# Patient Record
Sex: FEMALE | Race: WHITE | NOT HISPANIC OR LATINO | Employment: FULL TIME
[De-identification: names, ages, dates, MRNs, and addresses within clinical notes are randomized per-mention and may not be internally consistent; named-entity substitution may affect disease eponyms.]

---

## 2017-07-29 ENCOUNTER — HEALTH MAINTENANCE LETTER (OUTPATIENT)
Age: 47
End: 2017-07-29

## 2018-06-17 ENCOUNTER — HOSPITAL ENCOUNTER (EMERGENCY)
Facility: CLINIC | Age: 48
Discharge: HOME | End: 2018-06-18
Attending: EMERGENCY MEDICINE | Admitting: EMERGENCY MEDICINE
Payer: COMMERCIAL

## 2018-06-17 DIAGNOSIS — S32.512A CLOSED FRACTURE OF SUPERIOR RAMUS OF LEFT PUBIS, INITIAL ENCOUNTER (H): ICD-10-CM

## 2018-06-17 DIAGNOSIS — M25.572 ACUTE LEFT ANKLE PAIN: ICD-10-CM
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
## 2018-06-17 DIAGNOSIS — R07.9 ACUTE CHEST PAIN: ICD-10-CM

## 2018-06-17 PROCEDURE — 93010 ELECTROCARDIOGRAM REPORT: CPT | Mod: Z6 | Performed by: EMERGENCY MEDICINE

## 2018-06-17 PROCEDURE — 99284 EMERGENCY DEPT VISIT MOD MDM: CPT | Mod: 25 | Performed by: EMERGENCY MEDICINE

## 2018-06-17 PROCEDURE — 93005 ELECTROCARDIOGRAM TRACING: CPT

## 2018-06-17 PROCEDURE — 99285 EMERGENCY DEPT VISIT HI MDM: CPT | Mod: 25

## 2018-06-17 PROCEDURE — 36415 COLL VENOUS BLD VENIPUNCTURE: CPT

## 2018-06-17 NOTE — ED AVS SNAPSHOT
[facility] Emergency Department    [address] [address]    Phone:  [phone]    Fax:  [phone]                                       Aundrea Swenson   MRN: [id number]    Department:  [facility] Emergency Department   Date of Visit:  6/17/2018           Patient Information     Date Of Birth          1970        Your diagnoses for this visit were:     Acute chest pain     Fall down stairs, initial encounter     Closed fracture of superior ramus of left pubis, initial encounter (H)     Acute left ankle pain        You were seen by Felipe Gonzalez MD.        Discharge Instructions       Return to the emergency department if you have worsening symptoms, repeated vomiting, chest pain, or other concerns.  Otherwise follow-up in clinic for recheck in 1-2 weeks.  Use the walker for stability with ambulation and discontinue as you are feeling better.    Use ibuprofen and acetaminophen for your symptoms.  Use oxycodone as needed for pain that is not controlled by the prior two medications.    Oxycodone is an addictive opioid medication, please only take it when the pain is more than can be controlled by acetaminophen or ibuprofen alone. It will also make you lightheaded, nauseated, and constipated.  Do not drive, operate heavy machinery, or take care of young children while taking this medication.     Repeated studies have shown that the longer you use opioid pain medications, the longer it is until you return to normal function. It is our recommendation that you taper off opioids as quickly as possible with the goal of returning to normal function or near normal function. Long term use of opioids quickly results in growing tolerance to the medication (less of the benefits) and increased dependence (more of the bad side effects).     Pain is very difficult to treat and we can very rarely take away pain completely. If you are having difficulty with pain over several weeks after an  injury, you may need to start different medications and therapies (such as physical therapy, graded exercise, massage, and acupuncture). Please talk about this with your regular doctor.     If you are interested in seeking free, confidential treatment referral and information service for individuals and families facing mental health and/or substance use disorders please call [phone]    24 Hour Appointment Hotline       To make an appointment at any [facility] clinic, call [phone] ([phone]). If you don't have a family doctor or clinic, we will help you find one. [facility] clinics are conveniently located to serve the needs of you and your family.          ED Discharge Orders     ORTHO  REFERRAL       [facility] is referring you to the Orthopedic  Services at [facility].       The  Representative will assist you in the coordination of your Orthopedic and Musculoskeletal Care as prescribed by your physician.    The  Representative will call you within 1 business day to help schedule your appointment, or you may contact the  Representative at:    All areas ~ [phone]     Type of Referral : Non Surgical       Timeframe requested: 3 - 5 days    Coverage of these services is subject to the terms and limitations of your health insurance plan.  Please call member services at your health plan with any benefit or coverage questions.      If X-rays, CT or MRI's have been performed, please contact the facility where they were done to arrange for , prior to your scheduled appointment.  Please bring this referral request to your appointment and present it to your specialist.                     Review of your medicines      START taking        Dose / Directions Last dose taken    oxyCODONE IR 5 MG tablet   Commonly known as:  ROXICODONE   Dose:  5 mg   Quantity:  12 tablet        Take 1 tablet (5 mg) by mouth  every 6 hours as needed for pain   Refills:  0          Our records show that you are taking the medicines listed below. If these are incorrect, please call your family doctor or clinic.        Dose / Directions Last dose taken    aspirin 81 MG tablet   Dose:  1 tablet        Take 1 tablet by mouth daily.   Refills:  0        cyclobenzaprine 10 MG tablet   Commonly known as:  FLEXERIL   Dose:  10 mg   Quantity:  20 tablet        Take 1 tablet (10 mg) by mouth 3 times daily as needed   Refills:  1        diclofenac 50 MG EC tablet   Commonly known as:  VOLTAREN   Dose:  50 mg   Quantity:  90 tablet        Take 1 tablet (50 mg) by mouth 3 times daily   Refills:  1        EPINEPHrine 0.3 MG/0.3ML injection 2-pack   Commonly known as:  EPIPEN/ADRENACLICK/or ANY BX GENERIC EQUIV   Quantity:  2 each        Inject 0.3 mls into the muscle once as needed.   Refills:  1        lidocaine 5 % Patch   Commonly known as:  LIDODERM   Quantity:  30 patch        Apply 1 patches to effected area and leave in place for 12hours . Than remove and leave off for 12 hours.   Refills:  3        naproxen 500 MG tablet   Commonly known as:  NAPROSYN   Dose:  500 mg   Quantity:  100 tablet        Take 1 tablet (500 mg) by mouth every 12 hours   Refills:  0        nicotine 7 MG/24HR 24 hr patch   Commonly known as:  NICODERM CQ   Dose:  1 patch   Quantity:  30 patch        Place 1 patch onto the skin every 24 hours   Refills:  1        nitroGLYcerin 0.4 MG sublingual tablet   Commonly known as:  NITROSTAT   Dose:  0.4 mg   Quantity:  25 tablet        Place 1 tablet (0.4 mg) under the tongue every 5 minutes as needed Up to 3 dose, call 911 if pain persists   Refills:  0                Information about OPIOIDS     PRESCRIPTION OPIOIDS: WHAT YOU NEED TO KNOW   We gave you an opioid (narcotic) pain medicine. It is important to manage your pain, but opioids are not always the best choice. You should first try all the other options your care team  gave you. Take this medicine for as short a time (and as few doses) as possible.     These medicines have risks:    DO NOT drive when on new or higher doses of pain medicine. These medicines can affect your alertness and reaction times, and you could be arrested for driving under the influence (DUI). If you need to use opioids long-term, talk to your care team about driving.    DO NOT operate heave machinery    DO NOT do any other dangerous activities while taking these medicines.     DO NOT drink any alcohol while taking these medicines.      If the opioid prescribed includes acetaminophen, DO NOT take with any other medicines that contain acetaminophen. Read all labels carefully. Look for the word  acetaminophen  or  Tylenol.  Ask your pharmacist if you have questions or are unsure.    You can get addicted to pain medicines, especially if you have a history of addiction (chemical, alcohol or substance dependence). Talk to your care team about ways to reduce this risk.    Store your pills in a secure place, locked if possible. We will not replace any lost or stolen medicine. If you don t finish your medicine, please throw away (dispose) as directed by your pharmacist. The [place] [employer] has more information about safe disposal: https://www.pca.[facility].us/living-green/managing-unwanted-medications.     All opioids tend to cause constipation. Drink plenty of water and eat foods that have a lot of fiber, such as fruits, vegetables, prune juice, apple juice and high-fiber cereal. Take a laxative (Miralax, milk of magnesia, Colace, Senna) if you don t move your bowels at least every other day.         Prescriptions were sent or printed at these locations (1 Prescription)                   Other Prescriptions                Printed at Department/Unit printer (1 of 1)         oxyCODONE IR (ROXICODONE) 5 MG tablet                Procedures and tests performed during your visit     Procedure/Test Number  of Times Performed    Basic metabolic panel 1    CBC with platelets differential 1    EKG 12 lead 1    Pelvis XR w/ unilateral hip left 1    Troponin I 2    XR Ankle Left G/E 3 Views 1      Orders Needing Specimen Collection     None      Pending Results     Date and Time Order Name Status Description    6/18/2018 0002 Pelvis XR w/ unilateral hip left Preliminary     6/18/2018 0002 XR Ankle Left G/E 3 Views Preliminary             Pending Culture Results     No orders found for last 3 day(s).            Pending Results Instructions     If you had any lab results that were not finalized at the time of your Discharge, you can call the ED Lab Result RN at [phone]. You will be contacted by this team for any positive Lab results or changes in treatment. The nurses are available 7 days a week from 10A to 6:30P.  You can leave a message 24 hours per day and they will return your call.        Test Results From Your Hospital Stay        6/18/2018 12:40 AM      Component Results     Component Value Ref Range & Units Status    Sodium 144 133 - 144 mmol/L Final    Potassium 3.3 (L) 3.4 - 5.3 mmol/L Final    Chloride 111 (H) 94 - 109 mmol/L Final    Carbon Dioxide 22 20 - 32 mmol/L Final    Anion Gap 11 3 - 14 mmol/L Final    Glucose 79 70 - 99 mg/dL Final    Urea Nitrogen 15 7 - 30 mg/dL Final    Creatinine 0.73 0.52 - 1.04 mg/dL Final    GFR Estimate 86 >60 mL/min/1.7m2 Final    Non  GFR Calc    GFR Estimate If Black >90 >60 mL/min/1.7m2 Final    African American GFR Calc    Calcium 8.4 (L) 8.5 - 10.1 mg/dL Final         6/18/2018 12:22 AM      Component Results     Component Value Ref Range & Units Status    WBC 7.5 4.0 - 11.0 10e9/L Final    RBC Count 4.87 3.8 - 5.2 10e12/L Final    Hemoglobin 14.3 11.7 - 15.7 g/dL Final    Hematocrit 44.5 35.0 - 47.0 % Final    MCV 91 78 - 100 fl Final    MCH 29.4 26.5 - 33.0 pg Final    MCHC 32.1 31.5 - 36.5 g/dL Final    RDW 13.0 10.0 - 15.0 % Final    Platelet Count  199 150 - 450 10e9/L Final    Diff Method Automated Method  Final    % Neutrophils 50.8 % Final    % Lymphocytes 40.5 % Final    % Monocytes 5.7 % Final    % Eosinophils 2.3 % Final    % Basophils 0.4 % Final    % Immature Granulocytes 0.3 % Final    Nucleated RBCs 0 0 /100 Final    Absolute Neutrophil 3.8 1.6 - 8.3 10e9/L Final    Absolute Lymphocytes 3.0 0.8 - 5.3 10e9/L Final    Absolute Monocytes 0.4 0.0 - 1.3 10e9/L Final    Absolute Eosinophils 0.2 0.0 - 0.7 10e9/L Final    Absolute Basophils 0.0 0.0 - 0.2 10e9/L Final    Abs Immature Granulocytes 0.0 0 - 0.4 10e9/L Final    Absolute Nucleated RBC 0.0  Final         6/18/2018 12:40 AM      Component Results     Component Value Ref Range & Units Status    Troponin I ES <0.015 0.000 - 0.045 ug/L Final    The 99th percentile for upper reference range is 0.045 ug/L.  Troponin values   in the range of 0.045 - 0.120 ug/L may be associated with risks of adverse   clinical events.           6/18/2018 12:49 AM      Narrative     XR ANKLE LEFT GREATER THAN 3 VIEWS  6/18/2018 12:38 AM      HISTORY: Fall down stairs, lateral malleolar pain.     COMPARISON: None.        Impression     IMPRESSION: No acute fracture or dislocation.         6/18/2018 12:51 AM      Narrative     XR PELVIS AND HIP LEFT 1 VIEW   6/18/2018 12:48 AM     HISTORY: Fall down stairs, lateral hip pain.     COMPARISON: None.         Impression     IMPRESSION: There is a questionable nondisplaced fracture of the left  superior pubic ramus seen on one view. No other fracture or  dislocation.         6/18/2018  2:46 AM      Component Results     Component Value Ref Range & Units Status    Troponin I ES <0.015 0.000 - 0.045 ug/L Final    The 99th percentile for upper reference range is 0.045 ug/L.  Troponin values   in the range of 0.045 - 0.120 ug/L may be associated with risks of adverse   clinical events.                  Thank you for choosing [facility]       Thank you for choosing [facility] for your  "care. Our goal is always to provide you with excellent care. Hearing back from our patients is one way we can continue to improve our services. Please take a few minutes to complete the written survey that you may receive in the mail after you visit with us. Thank you!        Yoono Information     Yoono lets you send messages to your doctor, view your test results, renew your prescriptions, schedule appointments and more. To sign up, go to [email or web address] . Click on \"Log in\" on the left side of the screen, which will take you to the Welcome page. Then click on \"Sign up Now\" on the right side of the page.     You will be asked to enter the access code listed below, as well as some personal information. Please follow the directions to create your username and password.     Your access code is: [email or web address]  Expires: 2018  2:56 AM     Your access code will  in 90 days. If you need help or a new code, please call your [facility] clinic or [phone].        Care EveryWhere ID     This is your Care EveryWhere ID. This could be used by other organizations to access your [facility] medical records  [id number]        Equal Access to Services     ADRY MOORE AH: Deana Domínguez, matthew acevedo, benjie fonseca, jaspreet carpenter. So [facility] [phone].    ATENCIÓN: Si habla español, tiene a rosas disposición servicios gratuitos de asistencia lingüística. Nikko al [phone].    We comply with applicable federal civil rights laws and [place] laws. We do not discriminate on the basis of race, color, national origin, age, disability, sex, sexual orientation, or gender identity.            After Visit Summary       This is your record. Keep this with you and show to your community pharmacist(s) and doctor(s) at your next visit.                  "

## 2018-06-17 NOTE — ED AVS SNAPSHOT
[facility] Emergency Department    [address] [address]    Phone:  [phone]    Fax:  [phone]                                       Aundrea Swenson   MRN: [id number]    Department:  [facility] Emergency Department   Date of Visit:  6/17/2018           After Visit Summary Signature Page     I have received my discharge instructions, and my questions have been answered. I have discussed any challenges I see with this plan with the nurse or doctor.    ..........................................................................................................................................  Patient/Patient Representative Signature      ..........................................................................................................................................  Patient Representative Print Name and Relationship to Patient    ..................................................               ................................................  Date                                            Time    ..........................................................................................................................................  Reviewed by Signature/Title    ...................................................              ..............................................  Date                                                            Time

## 2018-06-18 ENCOUNTER — APPOINTMENT (OUTPATIENT)
Dept: GENERAL RADIOLOGY | Facility: CLINIC | Age: 48
End: 2018-06-18
Attending: EMERGENCY MEDICINE
Payer: COMMERCIAL

## 2018-06-18 VITALS
RESPIRATION RATE: 16 BRPM | DIASTOLIC BLOOD PRESSURE: 73 MMHG | OXYGEN SATURATION: 96 % | HEART RATE: 89 BPM | TEMPERATURE: 98.8 F | SYSTOLIC BLOOD PRESSURE: 106 MMHG

## 2018-06-18 LAB
ANION GAP SERPL CALCULATED.3IONS-SCNC: 11 MMOL/L (ref 3–14)
BASOPHILS # BLD AUTO: 0 10E9/L (ref 0–0.2)
BASOPHILS NFR BLD AUTO: 0.4 %
BUN SERPL-MCNC: 15 MG/DL (ref 7–30)
CALCIUM SERPL-MCNC: 8.4 MG/DL (ref 8.5–10.1)
CHLORIDE SERPL-SCNC: 111 MMOL/L (ref 94–109)
CO2 SERPL-SCNC: 22 MMOL/L (ref 20–32)
CREAT SERPL-MCNC: 0.73 MG/DL (ref 0.52–1.04)
DIFFERENTIAL METHOD BLD: NORMAL
EOSINOPHIL # BLD AUTO: 0.2 10E9/L (ref 0–0.7)
EOSINOPHIL NFR BLD AUTO: 2.3 %
ERYTHROCYTE [DISTWIDTH] IN BLOOD BY AUTOMATED COUNT: 13 % (ref 10–15)
GFR SERPL CREATININE-BSD FRML MDRD: 86 ML/MIN/1.7M2
GLUCOSE SERPL-MCNC: 79 MG/DL (ref 70–99)
HCT VFR BLD AUTO: 44.5 % (ref 35–47)
HGB BLD-MCNC: 14.3 G/DL (ref 11.7–15.7)
IMM GRANULOCYTES # BLD: 0 10E9/L (ref 0–0.4)
IMM GRANULOCYTES NFR BLD: 0.3 %
LYMPHOCYTES # BLD AUTO: 3 10E9/L (ref 0.8–5.3)
LYMPHOCYTES NFR BLD AUTO: 40.5 %
MCH RBC QN AUTO: 29.4 PG (ref 26.5–33)
MCHC RBC AUTO-ENTMCNC: 32.1 G/DL (ref 31.5–36.5)
MCV RBC AUTO: 91 FL (ref 78–100)
MONOCYTES # BLD AUTO: 0.4 10E9/L (ref 0–1.3)
MONOCYTES NFR BLD AUTO: 5.7 %
NEUTROPHILS # BLD AUTO: 3.8 10E9/L (ref 1.6–8.3)
NEUTROPHILS NFR BLD AUTO: 50.8 %
NRBC # BLD AUTO: 0 10*3/UL
NRBC BLD AUTO-RTO: 0 /100
PLATELET # BLD AUTO: 199 10E9/L (ref 150–450)
POTASSIUM SERPL-SCNC: 3.3 MMOL/L (ref 3.4–5.3)
RBC # BLD AUTO: 4.87 10E12/L (ref 3.8–5.2)
SODIUM SERPL-SCNC: 144 MMOL/L (ref 133–144)
TROPONIN I SERPL-MCNC: <0.015 UG/L (ref 0–0.04)
TROPONIN I SERPL-MCNC: <0.015 UG/L (ref 0–0.04)
WBC # BLD AUTO: 7.5 10E9/L (ref 4–11)

## 2018-06-18 PROCEDURE — 73610 X-RAY EXAM OF ANKLE: CPT | Mod: LT

## 2018-06-18 PROCEDURE — 80048 BASIC METABOLIC PNL TOTAL CA: CPT | Performed by: EMERGENCY MEDICINE

## 2018-06-18 PROCEDURE — 73502 X-RAY EXAM HIP UNI 2-3 VIEWS: CPT

## 2018-06-18 PROCEDURE — 85025 COMPLETE CBC W/AUTO DIFF WBC: CPT | Performed by: EMERGENCY MEDICINE

## 2018-06-18 PROCEDURE — 84484 ASSAY OF TROPONIN QUANT: CPT | Mod: 91 | Performed by: EMERGENCY MEDICINE

## 2018-06-18 PROCEDURE — 25000132 ZZH RX MED GY IP 250 OP 250 PS 637: Performed by: EMERGENCY MEDICINE

## 2018-06-18 RX ORDER — ASPIRIN 81 MG/1
324 TABLET, CHEWABLE ORAL ONCE
Status: COMPLETED | OUTPATIENT
Start: 2018-06-18 | End: 2018-06-18

## 2018-06-18 RX ORDER — OXYCODONE HYDROCHLORIDE 5 MG/1
10 TABLET ORAL ONCE
Status: COMPLETED | OUTPATIENT
Start: 2018-06-18 | End: 2018-06-18

## 2018-06-18 RX ORDER — OXYCODONE HYDROCHLORIDE 5 MG/1
5 TABLET ORAL EVERY 6 HOURS PRN
Qty: 12 TABLET | Refills: 0 | Status: SHIPPED | OUTPATIENT
Start: 2018-06-18 | End: 2019-08-14

## 2018-06-18 RX ORDER — OXYCODONE HYDROCHLORIDE 5 MG/1
5 TABLET ORAL ONCE
Status: COMPLETED | OUTPATIENT
Start: 2018-06-18 | End: 2018-06-18

## 2018-06-18 RX ADMIN — OXYCODONE HYDROCHLORIDE 10 MG: 5 TABLET ORAL at 02:19

## 2018-06-18 RX ADMIN — IBUPROFEN 600 MG: 400 TABLET ORAL at 02:19

## 2018-06-18 RX ADMIN — OXYCODONE HYDROCHLORIDE 5 MG: 5 TABLET ORAL at 00:08

## 2018-06-18 RX ADMIN — ASPIRIN 81 MG 324 MG: 81 TABLET ORAL at 00:07

## 2018-06-18 NOTE — ED NOTES
Pt would not let EKG be done before she went to the bathroom, this RN assisted pt in BR as pt has been drinking and is having chest pain.

## 2018-06-18 NOTE — ED PROVIDER NOTES
"  History     Chief Complaint   Patient presents with     Fall     slipped on wet wooden stairs and fell down them, hitting L side. Hip and ankle pain. Fell at 1700     Chest Pain     started 20 minutes ago, L side,      HPI  Aundrea Swenson is a 47 year old female who presents for left hip and ankle pain after fall as well as chest pain.  The patient says that about 6 hours prior to arrival she slipped on wet wooden stairs and fell onto her left hip and then slid down the steps.  She did not hit her head, no loss of consciousness, denies headache, nausea, vomiting, or drainage from ears or nose.  No neck pain.  She is complaining of severe pain in the left hip and left ankle, sharp, worse with moving.  She initially was able to walk without any difficulty but this pain slowly became worse and she says now she cannot walk.  Chest pain started about 30 minutes prior to arrival left side and feels sharp, radiates to the left arm.  She denies difficulty breathing or cough.  No fever, chills, abdominal pain, vomiting, diaphoresis, or rash.  She has not taken anything for symptoms.    Problem List:    Patient Active Problem List    Diagnosis Date Noted     Alcohol abuse, episodic drinking behavior 12/16/2013     Priority: Medium     Intermittent alcohol consumption that has resulted in increased agitation, worsening of chronic chest pain and acute coronary syndrome rule out admission at [facility] and [place] (12/2013).       CAD (coronary artery disease) 12/16/2013     Priority: Medium      - \"mild coronary artery disease\" found on angioplasty during chest pain work up at [facility] in 2010   - negative stress test 2010       Tobacco abuse 11/01/2013     Priority: Medium     Health Care Home 12/27/2012     Priority: Medium     Tier 0  DX V65.8 REPLACED WITH 69342 HEALTH CARE HOME (04/08/2013)       Prinzmetal angina (H) 12/27/2012     Priority: Medium        Past Medical History:    Past Medical History:   Diagnosis Date     " CAD (coronary artery disease) ~2005       Past Surgical History:    No past surgical history on file.    Family History:    No family history on file.    Social History:  Marital Status:   [4]  Social History   Substance Use Topics     Smoking status: Current Every Day Smoker     Smokeless tobacco: Not on file      Comment: pt in process of quitting per pt.     Alcohol use Not on file        Medications:      oxyCODONE IR (ROXICODONE) 5 MG tablet   aspirin 81 MG tablet   cyclobenzaprine (FLEXERIL) 10 MG tablet   diclofenac (VOLTAREN) 50 MG EC tablet   EPINEPHrine (EPIPEN) 0.3 MG/0.3ML injection   lidocaine (LIDODERM) 5 % patch   naproxen (NAPROSYN) 500 MG tablet   nicotine (NICODERM CQ) 7 MG/24HR patch 2h hr   nitroglycerin (NITROSTAT) 0.4 MG SL tablet         Review of Systems  Pertinent positives and negatives listed in the HPI, all other systems reviewed and are negative.    Physical Exam   BP: 127/84  Pulse: 86  Temp: 98.8  F (37.1  C)  Resp: 20  SpO2: 98 %      Physical Exam  /73  Pulse 89  Temp 98.8  F (37.1  C) (Oral)  Resp 16  SpO2 96%   GENERAL: Alert, cooperative, moderate distress  HEAD: No scalp laceration, hematoma, or abrasion.  No tenderness.  EYES: Conjunctivae clear, EOM intact. PERLL, Pupils are 3 mm.  HEENT:  Normal external ears, normal TM's without evidence of hemotympanum.  No nasal discharge or evidence of septal hematoma. No facial instability or asymmetry. No evidence of intraoral trauma.  Intact bite without malalignment.  NECK: Full painless range of motion.  No midline tenderness, no lateral tenderness.  CHEST:  No crepitus or tenderness with AP or lateral compression  CARDIOVASCULAR : Nml rate, distal pulses are normal and symmetric  LUNGS: No respiratory distress.  GI: Soft, ND, NT.   PELVIS: No crepitus with AP or lateral compression tenderness is present especially on the left side  BACK: No step-offs palpated, no tenderness to palpation of thoracic or lumbar  spine.  EXTREMITIES: No obvious deformities, tenderness to palpation of the leg hip and left ankle with no swelling or erythema  NEUROLOGICAL : GCS= 15.  Awake, alert, and oriented x 3.  Cranial nerves II-XII grossly intact. Normal muscle strength and tone, sensation to light touch grossly intact in all extremities.  No focal deficits.   SKIN : Normal color, no jaundice or rash. Lateral left ankle abrasions.      ED Course     ED Course     Procedures               EKG Interpretation:      Interpreted by Felipe Gonzalez  Time reviewed: 2345  Symptoms at time of EKG: Chest pain   Rhythm: normal sinus   Rate: normal  Axis: normal  Ectopy: none  Conduction: normal  ST Segments/ T Waves: No ST-T wave changes  Q Waves: none  Comparison to prior: No prior    Clinical Impression: normal EKG          Critical Care time:  none               Results for orders placed or performed during the hospital encounter of 06/17/18 (from the past 24 hour(s))   Basic metabolic panel   Result Value Ref Range    Sodium 144 133 - 144 mmol/L    Potassium 3.3 (L) 3.4 - 5.3 mmol/L    Chloride 111 (H) 94 - 109 mmol/L    Carbon Dioxide 22 20 - 32 mmol/L    Anion Gap 11 3 - 14 mmol/L    Glucose 79 70 - 99 mg/dL    Urea Nitrogen 15 7 - 30 mg/dL    Creatinine 0.73 0.52 - 1.04 mg/dL    GFR Estimate 86 >60 mL/min/1.7m2    GFR Estimate If Black >90 >60 mL/min/1.7m2    Calcium 8.4 (L) 8.5 - 10.1 mg/dL   CBC with platelets differential   Result Value Ref Range    WBC 7.5 4.0 - 11.0 10e9/L    RBC Count 4.87 3.8 - 5.2 10e12/L    Hemoglobin 14.3 11.7 - 15.7 g/dL    Hematocrit 44.5 35.0 - 47.0 %    MCV 91 78 - 100 fl    MCH 29.4 26.5 - 33.0 pg    MCHC 32.1 31.5 - 36.5 g/dL    RDW 13.0 10.0 - 15.0 %    Platelet Count 199 150 - 450 10e9/L    Diff Method Automated Method     % Neutrophils 50.8 %    % Lymphocytes 40.5 %    % Monocytes 5.7 %    % Eosinophils 2.3 %    % Basophils 0.4 %    % Immature Granulocytes 0.3 %    Nucleated RBCs 0 0 /100    Absolute  Neutrophil 3.8 1.6 - 8.3 10e9/L    Absolute Lymphocytes 3.0 0.8 - 5.3 10e9/L    Absolute Monocytes 0.4 0.0 - 1.3 10e9/L    Absolute Eosinophils 0.2 0.0 - 0.7 10e9/L    Absolute Basophils 0.0 0.0 - 0.2 10e9/L    Abs Immature Granulocytes 0.0 0 - 0.4 10e9/L    Absolute Nucleated RBC 0.0    Troponin I   Result Value Ref Range    Troponin I ES <0.015 0.000 - 0.045 ug/L   XR Ankle Left G/E 3 Views    Narrative    XR ANKLE LEFT GREATER THAN 3 VIEWS  6/18/2018 12:38 AM      HISTORY: Fall down stairs, lateral malleolar pain.     COMPARISON: None.      Impression    IMPRESSION: No acute fracture or dislocation.   Pelvis XR w/ unilateral hip left    Narrative    XR PELVIS AND HIP LEFT 1 VIEW   6/18/2018 12:48 AM     HISTORY: Fall down stairs, lateral hip pain.     COMPARISON: None.       Impression    IMPRESSION: There is a questionable nondisplaced fracture of the left  superior pubic ramus seen on one view. No other fracture or  dislocation.   Troponin I   Result Value Ref Range    Troponin I ES <0.015 0.000 - 0.045 ug/L       Medications   aspirin chewable tablet 324 mg (324 mg Oral Given 6/18/18 0007)   oxyCODONE IR (ROXICODONE) tablet 5 mg (5 mg Oral Given 6/18/18 0008)   ibuprofen (ADVIL/MOTRIN) tablet 600 mg (600 mg Oral Given 6/18/18 0219)   oxyCODONE IR (ROXICODONE) tablet 10 mg (10 mg Oral Given 6/18/18 0219)       Assessments & Plan (with Medical Decision Making)   47-year-old female who presents for evaluation after a fall.  Also having chest pain.  Temperature is 37.1 C, blood pressure 127/84, heart rate 86, SPO2 is 98% on room air.  No indication for CT of the head or neck at this time as she is low risk for intracranial hemorrhage, skull fracture, cervical spine fracture.  EKG sinus rhythm without signs of ischemia.  Initial troponin normal.  She is given aspirin and oxycodone for symptoms with improvement.  X-ray of the left hip and left ankle obtained, images reviewed independently as well as radiology read  reviewed, no signs of fracture or dislocation of the ankle, positive for fracture through the superior ramus on the left side of the pelvis.  This is likely causing her hip pain.  This is a nonoperative injury that involves weightbearing as tolerated.  She is able to get up with the use of a walker and get around the emergency department for this.  Repeat troponin normal.  Given the normal EKG and troponins over multiple hours, unlikely ACS and no further workup is pursued for this at this time.  The patient is discharged to home with instructions to use a walker for support, she is given a short course of oxycodone for severe pain, told to return if worse, otherwise follow-up in clinic.  The patient is in agreement with this plan.    I have reviewed the nursing notes.    I have reviewed the findings, diagnosis, plan and need for follow up with the patient.       Discharge Medication List as of 6/18/2018  3:05 AM      START taking these medications    Details   oxyCODONE IR (ROXICODONE) 5 MG tablet Take 1 tablet (5 mg) by mouth every 6 hours as needed for pain, Disp-12 tablet, R-0, Local Print             Final diagnoses:   Acute chest pain   Fall down stairs, initial encounter   Closed fracture of superior ramus of left pubis, initial encounter (H)   Acute left ankle pain       6/17/2018   [facility] EMERGENCY DEPARTMENT     Felipe Gonzalez MD  06/18/18 [id number]

## 2018-06-18 NOTE — DISCHARGE INSTRUCTIONS
Return to the emergency department if you have worsening symptoms, repeated vomiting, chest pain, or other concerns.  Otherwise follow-up in clinic for recheck in 1-2 weeks.  Use the walker for stability with ambulation and discontinue as you are feeling better.    Use ibuprofen and acetaminophen for your symptoms.  Use oxycodone as needed for pain that is not controlled by the prior two medications.    Oxycodone is an addictive opioid medication, please only take it when the pain is more than can be controlled by acetaminophen or ibuprofen alone. It will also make you lightheaded, nauseated, and constipated.  Do not drive, operate heavy machinery, or take care of young children while taking this medication.     Repeated studies have shown that the longer you use opioid pain medications, the longer it is until you return to normal function. It is our recommendation that you taper off opioids as quickly as possible with the goal of returning to normal function or near normal function. Long term use of opioids quickly results in growing tolerance to the medication (less of the benefits) and increased dependence (more of the bad side effects).     Pain is very difficult to treat and we can very rarely take away pain completely. If you are having difficulty with pain over several weeks after an injury, you may need to start different medications and therapies (such as physical therapy, graded exercise, massage, and acupuncture). Please talk about this with your regular doctor.     If you are interested in seeking free, confidential treatment referral and information service for individuals and families facing mental health and/or substance use disorders please call [phone] ([phone])

## 2018-06-18 NOTE — ED NOTES
Walked PT around side A with the help of assistants of a walker per Dr Gonzalez. Had PT sit back down on the bed and notified  and nurse.

## 2018-06-18 NOTE — ED NOTES
"Pt slipped and fell down 6 steps, injury occurred at 1700 today. Pt denies LOC, did not hit head. C/o left buttocks, hip, and ankle pain. Has been unable to bear weight, has been \"hobbling\" around. Pt had ETOH tonight to dull the pain. Pt has abrasion on left ankle. Pt also states has Prinzmetal chest pains that started approx 30 minutes ago.    "

## 2019-04-12 ENCOUNTER — HOSPITAL ENCOUNTER (INPATIENT)
Facility: CLINIC | Age: 49
LOS: 1 days | Discharge: HOME | End: 2019-04-13
Attending: PSYCHIATRY & NEUROLOGY | Admitting: PSYCHIATRY & NEUROLOGY
Payer: COMMERCIAL

## 2019-04-12 ENCOUNTER — HOSPITAL ENCOUNTER (EMERGENCY)
Facility: CLINIC | Age: 49
Discharge: ANOTHER HOSPITAL | End: 2019-04-12
Attending: EMERGENCY MEDICINE | Admitting: EMERGENCY MEDICINE
Payer: COMMERCIAL

## 2019-04-12 ENCOUNTER — APPOINTMENT (OUTPATIENT)
Dept: CARDIOLOGY | Facility: CLINIC | Age: 49
End: 2019-04-12
Attending: STUDENT IN AN ORGANIZED HEALTH CARE EDUCATION/TRAINING PROGRAM
Payer: COMMERCIAL

## 2019-04-12 ENCOUNTER — APPOINTMENT (OUTPATIENT)
Dept: CT IMAGING | Facility: CLINIC | Age: 49
End: 2019-04-12
Attending: EMERGENCY MEDICINE
Payer: COMMERCIAL

## 2019-04-12 ENCOUNTER — APPOINTMENT (OUTPATIENT)
Dept: MRI IMAGING | Facility: CLINIC | Age: 49
End: 2019-04-12
Attending: STUDENT IN AN ORGANIZED HEALTH CARE EDUCATION/TRAINING PROGRAM
Payer: COMMERCIAL

## 2019-04-12 VITALS
RESPIRATION RATE: 18 BRPM | HEART RATE: 80 BPM | BODY MASS INDEX: 31.12 KG/M2 | DIASTOLIC BLOOD PRESSURE: 60 MMHG | TEMPERATURE: 97.9 F | WEIGHT: 187 LBS | SYSTOLIC BLOOD PRESSURE: 101 MMHG | OXYGEN SATURATION: 98 %

## 2019-04-12 DIAGNOSIS — I63.9 CEREBROVASCULAR ACCIDENT (CVA), UNSPECIFIED MECHANISM (H): ICD-10-CM

## 2019-04-12 DIAGNOSIS — Z72.0 TOBACCO ABUSE: ICD-10-CM

## 2019-04-12 LAB
ABO + RH BLD: NORMAL
ABO + RH BLD: NORMAL
ALBUMIN SERPL-MCNC: 4 G/DL (ref 3.4–5)
ALBUMIN UR-MCNC: 10 MG/DL
ALP SERPL-CCNC: 69 U/L (ref 40–150)
ALT SERPL W P-5'-P-CCNC: 39 U/L (ref 0–50)
ANION GAP SERPL CALCULATED.3IONS-SCNC: 10 MMOL/L (ref 3–14)
ANION GAP SERPL CALCULATED.3IONS-SCNC: 9 MMOL/L (ref 3–14)
APPEARANCE UR: CLEAR
APTT PPP: 26 SEC (ref 22–37)
APTT PPP: 27 SEC (ref 22–37)
AST SERPL W P-5'-P-CCNC: 23 U/L (ref 0–45)
BASOPHILS # BLD AUTO: 0.1 10E9/L (ref 0–0.2)
BASOPHILS NFR BLD AUTO: 0.6 %
BILIRUB SERPL-MCNC: 0.1 MG/DL (ref 0.2–1.3)
BILIRUB UR QL STRIP: NEGATIVE
BLD GP AB SCN SERPL QL: NORMAL
BLOOD BANK CMNT PATIENT-IMP: NORMAL
BUN SERPL-MCNC: 13 MG/DL (ref 7–30)
BUN SERPL-MCNC: 15 MG/DL (ref 7–30)
CALCIUM SERPL-MCNC: 8.2 MG/DL (ref 8.5–10.1)
CALCIUM SERPL-MCNC: 9 MG/DL (ref 8.5–10.1)
CHLORIDE SERPL-SCNC: 111 MMOL/L (ref 94–109)
CHLORIDE SERPL-SCNC: 116 MMOL/L (ref 94–109)
CO2 SERPL-SCNC: 19 MMOL/L (ref 20–32)
CO2 SERPL-SCNC: 22 MMOL/L (ref 20–32)
COLOR UR AUTO: YELLOW
CREAT SERPL-MCNC: 0.58 MG/DL (ref 0.52–1.04)
CREAT SERPL-MCNC: 0.75 MG/DL (ref 0.52–1.04)
DIFFERENTIAL METHOD BLD: ABNORMAL
EOSINOPHIL # BLD AUTO: 0.2 10E9/L (ref 0–0.7)
EOSINOPHIL NFR BLD AUTO: 2.3 %
ERYTHROCYTE [DISTWIDTH] IN BLOOD BY AUTOMATED COUNT: 13 % (ref 10–15)
ERYTHROCYTE [DISTWIDTH] IN BLOOD BY AUTOMATED COUNT: 13.2 % (ref 10–15)
ETHANOL SERPL-MCNC: 0.08 G/DL
GFR SERPL CREATININE-BSD FRML MDRD: >90 ML/MIN/{1.73_M2}
GFR SERPL CREATININE-BSD FRML MDRD: >90 ML/MIN/{1.73_M2}
GLUCOSE BLDC GLUCOMTR-MCNC: 128 MG/DL (ref 70–99)
GLUCOSE BLDC GLUCOMTR-MCNC: 67 MG/DL (ref 70–99)
GLUCOSE BLDC GLUCOMTR-MCNC: 67 MG/DL (ref 70–99)
GLUCOSE BLDC GLUCOMTR-MCNC: 69 MG/DL (ref 70–99)
GLUCOSE BLDC GLUCOMTR-MCNC: 83 MG/DL (ref 70–99)
GLUCOSE BLDC GLUCOMTR-MCNC: 97 MG/DL (ref 70–99)
GLUCOSE SERPL-MCNC: 72 MG/DL (ref 70–99)
GLUCOSE SERPL-MCNC: 88 MG/DL (ref 70–99)
GLUCOSE UR STRIP-MCNC: NEGATIVE MG/DL
HBA1C MFR BLD: 5 % (ref 0–5.6)
HCT VFR BLD AUTO: 42 % (ref 35–47)
HCT VFR BLD AUTO: 45.3 % (ref 35–47)
HGB BLD-MCNC: 13.1 G/DL (ref 11.7–15.7)
HGB BLD-MCNC: 14.2 G/DL (ref 11.7–15.7)
HGB UR QL STRIP: NEGATIVE
IMM GRANULOCYTES # BLD: 0 10E9/L (ref 0–0.4)
IMM GRANULOCYTES NFR BLD: 0.2 %
INR PPP: 0.91 (ref 0.86–1.14)
INR PPP: 0.95 (ref 0.86–1.14)
INTERPRETATION ECG - MUSE: NORMAL
KETONES UR STRIP-MCNC: NEGATIVE MG/DL
LEUKOCYTE ESTERASE UR QL STRIP: NEGATIVE
LYMPHOCYTES # BLD AUTO: 3.8 10E9/L (ref 0.8–5.3)
LYMPHOCYTES NFR BLD AUTO: 44.7 %
MCH RBC QN AUTO: 29 PG (ref 26.5–33)
MCH RBC QN AUTO: 29.7 PG (ref 26.5–33)
MCHC RBC AUTO-ENTMCNC: 31.2 G/DL (ref 31.5–36.5)
MCHC RBC AUTO-ENTMCNC: 31.3 G/DL (ref 31.5–36.5)
MCV RBC AUTO: 92 FL (ref 78–100)
MCV RBC AUTO: 95 FL (ref 78–100)
MONOCYTES # BLD AUTO: 0.5 10E9/L (ref 0–1.3)
MONOCYTES NFR BLD AUTO: 5.7 %
MRSA DNA SPEC QL NAA+PROBE: NEGATIVE
MUCOUS THREADS #/AREA URNS LPF: PRESENT /LPF
NEUTROPHILS # BLD AUTO: 3.9 10E9/L (ref 1.6–8.3)
NEUTROPHILS NFR BLD AUTO: 46.5 %
NITRATE UR QL: NEGATIVE
NRBC # BLD AUTO: 0 10*3/UL
NRBC BLD AUTO-RTO: 0 /100
PH UR STRIP: 7 PH (ref 5–7)
PLATELET # BLD AUTO: 186 10E9/L (ref 150–450)
PLATELET # BLD AUTO: 214 10E9/L (ref 150–450)
POTASSIUM SERPL-SCNC: 3 MMOL/L (ref 3.4–5.3)
POTASSIUM SERPL-SCNC: 3.4 MMOL/L (ref 3.4–5.3)
PROT SERPL-MCNC: 7.5 G/DL (ref 6.8–8.8)
RBC # BLD AUTO: 4.41 10E12/L (ref 3.8–5.2)
RBC # BLD AUTO: 4.9 10E12/L (ref 3.8–5.2)
RBC #/AREA URNS AUTO: 2 /HPF (ref 0–2)
SODIUM SERPL-SCNC: 143 MMOL/L (ref 133–144)
SODIUM SERPL-SCNC: 145 MMOL/L (ref 133–144)
SOURCE: ABNORMAL
SP GR UR STRIP: 1.02 (ref 1–1.03)
SPECIMEN EXP DATE BLD: NORMAL
SPECIMEN SOURCE: NORMAL
SQUAMOUS #/AREA URNS AUTO: 2 /HPF (ref 0–1)
TROPONIN I SERPL-MCNC: <0.015 UG/L (ref 0–0.04)
TROPONIN I SERPL-MCNC: <0.015 UG/L (ref 0–0.04)
UROBILINOGEN UR STRIP-MCNC: NORMAL MG/DL (ref 0–2)
WBC # BLD AUTO: 6.5 10E9/L (ref 4–11)
WBC # BLD AUTO: 8.4 10E9/L (ref 4–11)
WBC #/AREA URNS AUTO: <1 /HPF (ref 0–5)

## 2019-04-12 PROCEDURE — 40000894 ZZH STATISTIC OT IP EVAL DEFER: Performed by: OCCUPATIONAL THERAPIST

## 2019-04-12 PROCEDURE — 40000893 ZZH STATISTIC PT IP EVAL DEFER

## 2019-04-12 PROCEDURE — 86901 BLOOD TYPING SEROLOGIC RH(D): CPT | Performed by: STUDENT IN AN ORGANIZED HEALTH CARE EDUCATION/TRAINING PROGRAM

## 2019-04-12 PROCEDURE — 36415 COLL VENOUS BLD VENIPUNCTURE: CPT | Performed by: PSYCHIATRY & NEUROLOGY

## 2019-04-12 PROCEDURE — 93005 ELECTROCARDIOGRAM TRACING: CPT

## 2019-04-12 PROCEDURE — 25000132 ZZH RX MED GY IP 250 OP 250 PS 637: Performed by: PSYCHIATRY & NEUROLOGY

## 2019-04-12 PROCEDURE — 93010 ELECTROCARDIOGRAM REPORT: CPT | Performed by: INTERNAL MEDICINE

## 2019-04-12 PROCEDURE — 93306 TTE W/DOPPLER COMPLETE: CPT | Mod: 26 | Performed by: INTERNAL MEDICINE

## 2019-04-12 PROCEDURE — 84484 ASSAY OF TROPONIN QUANT: CPT | Performed by: EMERGENCY MEDICINE

## 2019-04-12 PROCEDURE — 86850 RBC ANTIBODY SCREEN: CPT | Performed by: STUDENT IN AN ORGANIZED HEALTH CARE EDUCATION/TRAINING PROGRAM

## 2019-04-12 PROCEDURE — 80048 BASIC METABOLIC PNL TOTAL CA: CPT | Performed by: PSYCHIATRY & NEUROLOGY

## 2019-04-12 PROCEDURE — 85027 COMPLETE CBC AUTOMATED: CPT | Performed by: PSYCHIATRY & NEUROLOGY

## 2019-04-12 PROCEDURE — 25000132 ZZH RX MED GY IP 250 OP 250 PS 637: Performed by: STUDENT IN AN ORGANIZED HEALTH CARE EDUCATION/TRAINING PROGRAM

## 2019-04-12 PROCEDURE — 86900 BLOOD TYPING SEROLOGIC ABO: CPT | Performed by: STUDENT IN AN ORGANIZED HEALTH CARE EDUCATION/TRAINING PROGRAM

## 2019-04-12 PROCEDURE — 81001 URINALYSIS AUTO W/SCOPE: CPT | Performed by: STUDENT IN AN ORGANIZED HEALTH CARE EDUCATION/TRAINING PROGRAM

## 2019-04-12 PROCEDURE — 80053 COMPREHEN METABOLIC PANEL: CPT | Performed by: EMERGENCY MEDICINE

## 2019-04-12 PROCEDURE — 70551 MRI BRAIN STEM W/O DYE: CPT

## 2019-04-12 PROCEDURE — 40000141 ZZH STATISTIC PERIPHERAL IV START W/O US GUIDANCE

## 2019-04-12 PROCEDURE — 25800030 ZZH RX IP 258 OP 636: Performed by: STUDENT IN AN ORGANIZED HEALTH CARE EDUCATION/TRAINING PROGRAM

## 2019-04-12 PROCEDURE — 80320 DRUG SCREEN QUANTALCOHOLS: CPT | Performed by: PSYCHIATRY & NEUROLOGY

## 2019-04-12 PROCEDURE — 20000004 ZZH R&B ICU UMMC

## 2019-04-12 PROCEDURE — 40000895 ZZH STATISTIC SLP IP EVAL DEFER

## 2019-04-12 PROCEDURE — 83036 HEMOGLOBIN GLYCOSYLATED A1C: CPT | Performed by: PSYCHIATRY & NEUROLOGY

## 2019-04-12 PROCEDURE — 70450 CT HEAD/BRAIN W/O DYE: CPT

## 2019-04-12 PROCEDURE — 85730 THROMBOPLASTIN TIME PARTIAL: CPT | Performed by: PSYCHIATRY & NEUROLOGY

## 2019-04-12 PROCEDURE — 85025 COMPLETE CBC W/AUTO DIFF WBC: CPT | Performed by: EMERGENCY MEDICINE

## 2019-04-12 PROCEDURE — 85730 THROMBOPLASTIN TIME PARTIAL: CPT | Performed by: EMERGENCY MEDICINE

## 2019-04-12 PROCEDURE — 96374 THER/PROPH/DIAG INJ IV PUSH: CPT

## 2019-04-12 PROCEDURE — 99285 EMERGENCY DEPT VISIT HI MDM: CPT | Mod: 25

## 2019-04-12 PROCEDURE — 25000128 H RX IP 250 OP 636: Performed by: EMERGENCY MEDICINE

## 2019-04-12 PROCEDURE — 25800025 ZZH RX 258

## 2019-04-12 PROCEDURE — 93010 ELECTROCARDIOGRAM REPORT: CPT | Mod: Z6 | Performed by: EMERGENCY MEDICINE

## 2019-04-12 PROCEDURE — 40000264 ECHOCARDIOGRAM COMPLETE

## 2019-04-12 PROCEDURE — 96375 TX/PRO/DX INJ NEW DRUG ADDON: CPT

## 2019-04-12 PROCEDURE — 85610 PROTHROMBIN TIME: CPT | Performed by: PSYCHIATRY & NEUROLOGY

## 2019-04-12 PROCEDURE — 87640 STAPH A DNA AMP PROBE: CPT | Performed by: STUDENT IN AN ORGANIZED HEALTH CARE EDUCATION/TRAINING PROGRAM

## 2019-04-12 PROCEDURE — 99291 CRITICAL CARE FIRST HOUR: CPT | Mod: 25 | Performed by: EMERGENCY MEDICINE

## 2019-04-12 PROCEDURE — 00000146 ZZHCL STATISTIC GLUCOSE BY METER IP

## 2019-04-12 PROCEDURE — 70498 CT ANGIOGRAPHY NECK: CPT

## 2019-04-12 PROCEDURE — 25000125 ZZHC RX 250: Performed by: EMERGENCY MEDICINE

## 2019-04-12 PROCEDURE — 84484 ASSAY OF TROPONIN QUANT: CPT | Performed by: PSYCHIATRY & NEUROLOGY

## 2019-04-12 PROCEDURE — 87641 MR-STAPH DNA AMP PROBE: CPT | Performed by: STUDENT IN AN ORGANIZED HEALTH CARE EDUCATION/TRAINING PROGRAM

## 2019-04-12 PROCEDURE — 85610 PROTHROMBIN TIME: CPT | Performed by: EMERGENCY MEDICINE

## 2019-04-12 PROCEDURE — 36415 COLL VENOUS BLD VENIPUNCTURE: CPT | Performed by: STUDENT IN AN ORGANIZED HEALTH CARE EDUCATION/TRAINING PROGRAM

## 2019-04-12 RX ORDER — POTASSIUM CL/LIDO/0.9 % NACL 10MEQ/0.1L
10 INTRAVENOUS SOLUTION, PIGGYBACK (ML) INTRAVENOUS
Status: DISCONTINUED | OUTPATIENT
Start: 2019-04-12 | End: 2019-04-13 | Stop reason: HOSPADM

## 2019-04-12 RX ORDER — HYDRALAZINE HYDROCHLORIDE 20 MG/ML
10 INJECTION INTRAMUSCULAR; INTRAVENOUS
Status: DISCONTINUED | OUTPATIENT
Start: 2019-04-12 | End: 2019-04-13 | Stop reason: HOSPADM

## 2019-04-12 RX ORDER — POTASSIUM CHLORIDE 29.8 MG/ML
20 INJECTION INTRAVENOUS
Status: DISCONTINUED | OUTPATIENT
Start: 2019-04-12 | End: 2019-04-13 | Stop reason: HOSPADM

## 2019-04-12 RX ORDER — LIDOCAINE 40 MG/G
CREAM TOPICAL
Status: DISCONTINUED | OUTPATIENT
Start: 2019-04-12 | End: 2019-04-13 | Stop reason: HOSPADM

## 2019-04-12 RX ORDER — CALCIUM CARBONATE 500 MG/1
1000 TABLET, CHEWABLE ORAL EVERY 6 HOURS PRN
Status: DISCONTINUED | OUTPATIENT
Start: 2019-04-12 | End: 2019-04-13 | Stop reason: HOSPADM

## 2019-04-12 RX ORDER — NALOXONE HYDROCHLORIDE 0.4 MG/ML
.1-.4 INJECTION, SOLUTION INTRAMUSCULAR; INTRAVENOUS; SUBCUTANEOUS
Status: DISCONTINUED | OUTPATIENT
Start: 2019-04-12 | End: 2019-04-13 | Stop reason: HOSPADM

## 2019-04-12 RX ORDER — AMOXICILLIN 250 MG
1-2 CAPSULE ORAL
Status: DISCONTINUED | OUTPATIENT
Start: 2019-04-12 | End: 2019-04-13 | Stop reason: HOSPADM

## 2019-04-12 RX ORDER — HYDRALAZINE HYDROCHLORIDE 20 MG/ML
10-12 INJECTION INTRAMUSCULAR; INTRAVENOUS
Status: DISCONTINUED | OUTPATIENT
Start: 2019-04-12 | End: 2019-04-12

## 2019-04-12 RX ORDER — LABETALOL 20 MG/4 ML (5 MG/ML) INTRAVENOUS SYRINGE
10 EVERY 10 MIN PRN
Status: DISCONTINUED | OUTPATIENT
Start: 2019-04-12 | End: 2019-04-13 | Stop reason: HOSPADM

## 2019-04-12 RX ORDER — ONDANSETRON 2 MG/ML
4 INJECTION INTRAMUSCULAR; INTRAVENOUS EVERY 6 HOURS PRN
Status: DISCONTINUED | OUTPATIENT
Start: 2019-04-12 | End: 2019-04-13 | Stop reason: HOSPADM

## 2019-04-12 RX ORDER — MIDAZOLAM HYDROCHLORIDE 5 MG/ML
2 INJECTION, SOLUTION INTRAMUSCULAR; INTRAVENOUS ONCE
Status: COMPLETED | OUTPATIENT
Start: 2019-04-12 | End: 2019-04-12

## 2019-04-12 RX ORDER — MAGNESIUM SULFATE HEPTAHYDRATE 40 MG/ML
4 INJECTION, SOLUTION INTRAVENOUS EVERY 4 HOURS PRN
Status: DISCONTINUED | OUTPATIENT
Start: 2019-04-12 | End: 2019-04-13 | Stop reason: HOSPADM

## 2019-04-12 RX ORDER — DEXTROSE MONOHYDRATE 25 G/50ML
INJECTION, SOLUTION INTRAVENOUS
Status: COMPLETED
Start: 2019-04-12 | End: 2019-04-12

## 2019-04-12 RX ORDER — SODIUM CHLORIDE 9 MG/ML
INJECTION, SOLUTION INTRAVENOUS CONTINUOUS
Status: DISCONTINUED | OUTPATIENT
Start: 2019-04-12 | End: 2019-04-12

## 2019-04-12 RX ORDER — POTASSIUM CHLORIDE 1.5 G/1.58G
20-40 POWDER, FOR SOLUTION ORAL
Status: DISCONTINUED | OUTPATIENT
Start: 2019-04-12 | End: 2019-04-13 | Stop reason: HOSPADM

## 2019-04-12 RX ORDER — IOPAMIDOL 755 MG/ML
70 INJECTION, SOLUTION INTRAVASCULAR ONCE
Status: COMPLETED | OUTPATIENT
Start: 2019-04-12 | End: 2019-04-12

## 2019-04-12 RX ORDER — ONDANSETRON 4 MG/1
4 TABLET, ORALLY DISINTEGRATING ORAL EVERY 6 HOURS PRN
Status: DISCONTINUED | OUTPATIENT
Start: 2019-04-12 | End: 2019-04-13 | Stop reason: HOSPADM

## 2019-04-12 RX ORDER — ACETAMINOPHEN 325 MG/1
650 TABLET ORAL EVERY 4 HOURS PRN
Status: DISCONTINUED | OUTPATIENT
Start: 2019-04-12 | End: 2019-04-13 | Stop reason: HOSPADM

## 2019-04-12 RX ORDER — NICOTINE POLACRILEX 4 MG
15-30 LOZENGE BUCCAL
Status: DISCONTINUED | OUTPATIENT
Start: 2019-04-12 | End: 2019-04-13 | Stop reason: HOSPADM

## 2019-04-12 RX ORDER — ACETAMINOPHEN 325 MG/1
650 TABLET ORAL EVERY 4 HOURS PRN
Status: DISCONTINUED | OUTPATIENT
Start: 2019-04-12 | End: 2019-04-12

## 2019-04-12 RX ORDER — SODIUM CHLORIDE, SODIUM LACTATE, POTASSIUM CHLORIDE, CALCIUM CHLORIDE 600; 310; 30; 20 MG/100ML; MG/100ML; MG/100ML; MG/100ML
INJECTION, SOLUTION INTRAVENOUS
Status: DISPENSED
Start: 2019-04-12 | End: 2019-04-13

## 2019-04-12 RX ORDER — POTASSIUM CHLORIDE 7.45 MG/ML
10 INJECTION INTRAVENOUS
Status: DISCONTINUED | OUTPATIENT
Start: 2019-04-12 | End: 2019-04-13 | Stop reason: HOSPADM

## 2019-04-12 RX ORDER — ASPIRIN 81 MG/1
81 TABLET, CHEWABLE ORAL DAILY
Status: DISCONTINUED | OUTPATIENT
Start: 2019-04-13 | End: 2019-04-13 | Stop reason: HOSPADM

## 2019-04-12 RX ORDER — POTASSIUM CHLORIDE 750 MG/1
20-40 TABLET, EXTENDED RELEASE ORAL
Status: DISCONTINUED | OUTPATIENT
Start: 2019-04-12 | End: 2019-04-13 | Stop reason: HOSPADM

## 2019-04-12 RX ORDER — DEXTROSE MONOHYDRATE 25 G/50ML
25-50 INJECTION, SOLUTION INTRAVENOUS
Status: DISCONTINUED | OUTPATIENT
Start: 2019-04-12 | End: 2019-04-13 | Stop reason: HOSPADM

## 2019-04-12 RX ADMIN — IOPAMIDOL 70 ML: 755 INJECTION, SOLUTION INTRAVENOUS at 01:06

## 2019-04-12 RX ADMIN — ACETAMINOPHEN 650 MG: 325 TABLET, FILM COATED ORAL at 22:41

## 2019-04-12 RX ADMIN — MIDAZOLAM HYDROCHLORIDE 2 MG: 5 INJECTION, SOLUTION INTRAMUSCULAR; INTRAVENOUS at 01:34

## 2019-04-12 RX ADMIN — SODIUM CHLORIDE 500 ML: 9 INJECTION, SOLUTION INTRAVENOUS at 01:12

## 2019-04-12 RX ADMIN — CALCIUM CARBONATE (ANTACID) CHEW TAB 500 MG 1000 MG: 500 CHEW TAB at 16:31

## 2019-04-12 RX ADMIN — ACETAMINOPHEN 650 MG: 325 TABLET, FILM COATED ORAL at 12:24

## 2019-04-12 RX ADMIN — DEXTROSE MONOHYDRATE 25 ML: 25 INJECTION, SOLUTION INTRAVENOUS at 03:12

## 2019-04-12 RX ADMIN — SODIUM CHLORIDE 100 ML: 9 INJECTION, SOLUTION INTRAVENOUS at 01:06

## 2019-04-12 RX ADMIN — ACETAMINOPHEN 650 MG: 325 TABLET, FILM COATED ORAL at 07:58

## 2019-04-12 RX ADMIN — ACETAMINOPHEN 650 MG: 325 TABLET, FILM COATED ORAL at 15:53

## 2019-04-12 RX ADMIN — ALTEPLASE 68 MG: KIT at 01:57

## 2019-04-12 RX ADMIN — ALTEPLASE 8 MG: KIT at 01:54

## 2019-04-12 RX ADMIN — SODIUM CHLORIDE: 9 INJECTION, SOLUTION INTRAVENOUS at 03:26

## 2019-04-12 ASSESSMENT — ENCOUNTER SYMPTOMS
COUGH: 0
CONFUSION: 0
NAUSEA: 0
NECK STIFFNESS: 0
TROUBLE SWALLOWING: 0
WEAKNESS: 1
NECK PAIN: 0
SORE THROAT: 0
DYSURIA: 0
NUMBNESS: 1
CHEST TIGHTNESS: 0
HEADACHES: 0
ABDOMINAL PAIN: 0
CHILLS: 0
FATIGUE: 0
VOMITING: 0
FEVER: 0
VOICE CHANGE: 0
LIGHT-HEADEDNESS: 0
SHORTNESS OF BREATH: 0
BACK PAIN: 0
APPETITE CHANGE: 0

## 2019-04-12 ASSESSMENT — VISUAL ACUITY
OU: BASELINE

## 2019-04-12 ASSESSMENT — ACTIVITIES OF DAILY LIVING (ADL)
ADLS_ACUITY_SCORE: 18
ADLS_ACUITY_SCORE: 16
ADLS_ACUITY_SCORE: 15
ADLS_ACUITY_SCORE: 18
ADLS_ACUITY_SCORE: 18

## 2019-04-12 ASSESSMENT — PAIN DESCRIPTION - DESCRIPTORS
DESCRIPTORS: ACHING
DESCRIPTORS: HEADACHE

## 2019-04-12 NOTE — PLAN OF CARE
Admitted/transferred from: [facility] at 0255  Reason for admission/transfer: Post-TPA and neuro monitoring  Patient status upon admission/transfer: Stable  Interventions: IV fluids started, neuro and vital sign monitoring per protocol.   Plan: To continue to monitor per stroke protocol, assess changes and notify provider   2 RN skin assessment: completed by Penny Vernon & Evon KENNEY   Result of skin assessment and interventions/actions: Pt skin CDI-no open wounds or sores, able to weight shift as needed.  Height, weight, drug calc weight: done   Patient belongings: Sent home with son and daughter, clothes kept in the room.

## 2019-04-12 NOTE — H&P
[facility]      Neurology Stroke Admission    Patient Name: Aundrea Swenson  : 1970 MRN#: [id number]    STROKE DATA    Stroke Code:  Stroke code not activated.  Time patient seen:  2019 0320  Last known normal (pt's baseline):  19 2300     TPA treatment:  Given at an outside facility at [facility] 19 at 0135       Stroke Scales      National Institutes of Health Stroke Scale (at presentation)   NIHSS done at:  time patient seen      Score    Level of consciousness:  (0)   Alert, keenly responsive    LOC questions:  (0)   Answers both questions correctly    LOC commands:  (0)   Performs both tasks correctly    Best gaze:  (0)   Normal    Visual:  (0)   No visual loss    Facial palsy:  (2)   Partial paralysis (total/near total of lower face)    Motor arm (left):  (1)   Drift    Motor arm (right):  (0)   No drift    Motor leg (left):  (1)   Drift    Motor leg (right):  (0)   No drift    Limb ataxia:  (0)   Absent    Sensory:  (1)   Mild to moderate sensory loss    Best language:  (0)   Normal- no aphasia    Dysarthria:  (1)   Mild to moderate dysarthria    Extinction and inattention:  (0)   No abnormality        NIHSS Total Score:  6          Dysphagia Screen  Time of screenin2019   Screening results: Dysarthria present - maintain NPO, consult SLP     ASSESSMENT & PLAN BY PROBLEM     Ms Aundrea Swenson is a 48 year old woman with a PMHx of CAD who presented to [facility] ED with acute onset left sided weakness that started on 19 at 2300. She was evaluated at [facility] ED and tPA was administered. Transferred to [facility] for further cares. On arrival she had an NIHSS of 6. Given no cortical signs found on examination, suspect etiology to be secondary to small vessel disease. However, MRI will help better delineate probably etiologies.     Work-up for Ischemic Stroke (post TPA)      Acute Ischemic Stroke (post tPA) Plan  - Risks and benefits of tPA  discussed with patient prior to administration at OSH  - Admit to Neuro ICU, under Neurocritical Care Team  - Close monitoring and neurochecks for any evidence of hemorrhagic transformation or allergic reaction  - Labetalol or hydralazine PRN to maintain BP < 180/105  - Euthermia, Euglycemia  - Head of bed elevated  - Hold aspirin and pharmacologic DVT prophylaxis for at least 24 hrs post-tPA  - Lipid panel, consider statins if LDL elevated, of note, patient developed myalgia secondary to atorvastatin in the past, therefore would need to consider alternative agents   - Repeat HCT 24 hrs post-tPA  - MRI Stroke Protocol  - TTE with Bubble Study  - Telemetry, EKG  - Bedside Glucose Monitoring  - A1c,Troponin x 3  - PT/OT/SLP  - PM&R  - Stroke Education  - Depression Screen  - Apnea Screen    During initial physical assessment, the plan of care was discussed and developed with patient.  Plan of care includes: close monitoring overnight, stroke work up in AM, evaluation by PT/OT in AM. .    Patient was admitted via transfer from [facility] ED.    The patient will be admitted to the Neuro Critical Care/Stroke team..     Other Medical Problems:  CAD/Prinzmetal's Angina  - Patient reports taking ASA 81mg inconsistently PTA    Fluids/Electrolytes/Nutrition  0.9% sodium chloride @ 75 mL/hr  Avoid hypotonic fluids.    Nutrition: Orders Placed This Encounter      NPO for Medical/Clinical Reasons Except for: No Exceptions    Prophylaxis            For VTE Prevention:  - pneumatic compression device    For Acid Suppression:  - GI prophylaxis is not indicated    Code Status  Full Code    HPI  Ms Aundrea Swenson is a 48 year old woman with a PMHx of CAD who presented to [facility] ED with acute onset left sided weakness. Patient reports that symptoms began at 2300 while at the bar. She reported some slurred speech and weakness on her left side. She was then evaluated by EMS who found left sided facial droop and LUE drift. She also  reported numbness in the left arm as well as nausea and dizziness. Reports that she has had similar symptoms (left facial droop, left sided weakness, slurring of speech approximately 1 year ago and was evaluated at [facility] and was told she had had a seizure or TIA. She reports that her symptoms at that time lasted for two days, however  reports that symptoms had lasted for 45 minutes.     Reports chronic knee/leg pain due to ACL tear with sensory disturbances in the left leg as well as weakness. However, she does not use a walker/cane.     Pertinent Past Medical/Surgical History  Past Medical History:   Diagnosis Date     CAD (coronary artery disease) ~2005    Found on angioplasty at [facility] during w/u for angina     Medications:   Medications Prior to Admission   Medication Sig Dispense Refill Last Dose     aspirin 81 MG tablet Take 1 tablet by mouth daily.        cyclobenzaprine (FLEXERIL) 10 MG tablet Take 1 tablet (10 mg) by mouth 3 times daily as needed 20 tablet 1      diclofenac (VOLTAREN) 50 MG EC tablet Take 1 tablet (50 mg) by mouth 3 times daily 90 tablet 1      EPINEPHrine (EPIPEN) 0.3 MG/0.3ML injection Inject 0.3 mls into the muscle once as needed. 2 each 1      lidocaine (LIDODERM) 5 % patch Apply 1 patches to effected area and leave in place for 12hours . Than remove and leave off for 12 hours. 30 patch 3      naproxen (NAPROSYN) 500 MG tablet Take 1 tablet (500 mg) by mouth every 12 hours 100 tablet 0      nicotine (NICODERM CQ) 7 MG/24HR patch 2h hr Place 1 patch onto the skin every 24 hours 30 patch 1      nitroglycerin (NITROSTAT) 0.4 MG SL tablet Place 1 tablet (0.4 mg) under the tongue every 5 minutes as needed Up to 3 dose, call 911 if pain persists 25 tablet 0      oxyCODONE IR (ROXICODONE) 5 MG tablet Take 1 tablet (5 mg) by mouth every 6 hours as needed for pain 12 tablet 0      Allergies:   Allergies   Allergen Reactions     Atorvastatin Muscle Pain (Myalgia)     Cramps in  calves     Bee Venom      Codeine      Penicillins      [place]      Family History:   History of stroke in grandfather (7 strokes) and grandmother.     Social History:   Alcohol use: reports 4-5 drinks per month  Tobacco use: Current smoker, 1/3 to 3/4 PPD    ROS:  The 10 point Review of Systems is negative other than noted in the HPI or here.     PHYSICAL EXAMINATION  Vital Signs:  B/P: 108/72 T: 97.9F P: 79 RR: 20     General:  patient is lying in bed, is yelling and distressed and demanding a commode right now    HEENT:  normocephalic/atraumatic  Cardio:  RRR  Pulmonary:  no respiratory distress  Abdomen:  soft  Extremities:  no edema  Skin:  intact     Neurologic  Mental Status:  fully alert, attentive and oriented, follows commands, dysarthric speech with intact fluency and comprehension  Cranial Nerves:  visual fields intact, EOMI with normal smooth pursuit, facial sensation intact and symmetric, hearing not formally tested but intact to conversation, palate elevation symmetric and uvula midline, shoulder shrug strong bilaterally, tongue protrusion midline, loss of nasolabial fold, left facial droop, dysarthric speech  Motor:  no abnormal movements, normal tone throughout, normal muscle bulk, LUE/LLE drift, strength is 4/5 in LUE, 3/5 in LLE  Reflexes:  2+ and symmetric throughout, toes downgoing  Sensory:  intact to light touch and pinprick in RUE/RLE, reduced to light touch in LUE (intact to pinprick), reduced to both light touch and pinprick in LLE  Coordination:  FNF and HS intact without dysmetria  Station/Gait:  not tested    Labs  Labs and Imaging reviewed and used in developing the plan; pertinent results included.     Lab Results   Component Value Date    GLC 88 04/12/2019       The patient was discussed with the fellow, Dr. Perales.  The staff is Dr. Felipe.    Oxana Lemos MD  Pager: [phone]

## 2019-04-12 NOTE — CONSULTS
Stroke: Telephone Consult    Case discussed with Dr. Stokes.    Patient with CAD who presents with acute on left facial droop, dysarthria, left arm weakness/numbness starting at 11pm.  Head CT/CTA unrevealing.  No contraindications to IV tPA.    Recommended treatment with IV tPA and transfer to [facility] 4a bed for post-tPA care.    Manuel Felipe MD, MS  Neurology    Please contact the stroke service with any questions: link to Text page

## 2019-04-12 NOTE — PLAN OF CARE
PT consult received and appreciated. Pt demos indep bed mobility, transfers, ambulation into and out of bathroom without UE assist. Reports no sensation changes. Main complaint is L knee pain from previous ACL injury but pt is managing in OP. PT will complete orders as pt with no acute PT needs or concerns at this time.

## 2019-04-12 NOTE — PLAN OF CARE
SLP: SLP evaluation deferred. Per conversation with RN, pt passed the nursing bedside screen without concerns for dysphagia or aspiration. Based on my conversation with pt, she feels her speech is at baseline and denies any cog/ling deficits. Pt has multiple family members present that agree with her and feel she is at baseline.     No SLP evaluation warranted at this time. Will sign off, please re order if concerns arise.

## 2019-04-12 NOTE — CONSULTS
PM&R consult received. Patient seen and examined at bedside with family present. Per patient and family report, she has experienced full resolution of symptoms. Does not have any rehab needs at this time.     Please do not hesitate to contact PM&R if new rehab needs arise.     Preeti Shah MD  Physical Medicine & Rehabilitation   [phone]

## 2019-04-12 NOTE — CONSULTS
Social Work: Assessment with Discharge Plan    Patient Name:  Jimmy Swenson  :  1970  Age:  48 year old  MRN:  [id number]  Risk/Complexity Score:     Completed assessment with:  Pt, chart review    Presenting Information   Reason for Referral:  Stroke consult  Date of Intake:  2019  Referral Source:  Physician  Decision Maker:  Pt  Alternate Decision Maker:  Per [facility] policy  Health Care Directive:  Declined completing  Living Situation:  House  Previous Functional Status:  Independent  Patient and family understanding of hospitalization:  Restorative  Cultural/Language/Spiritual Considerations:  None identified  Adjustment to Illness:  Pt has a large, supportive family. She says she feels fine and wants to go home ASAP. This is not her first stroke and she seems to have normalized the experience.    Physical Health  Reason for Admission:  No diagnosis found.  Services Needed/Recommended:  Home with no services    Mental Health/Chemical Dependency  Diagnosis:  None  Support/Services in Place:  N/A  Services Needed/Recommended:  N/A    Support System  Significant relationship at present time:  Family. Pt is  and has 6 adult children, mother, other family members living nearby.   Family of origin is available for support:  Yes  Other support available:  Extended family  Gaps in support system:  None  Patient is caregiver to:  None     Provider Information   Primary Care Physician:  No primary care provider on file.   None   Clinic:  No primary physician on file.      :  N/A    Financial   Income Source:  Not discussed  Financial Concerns:  None identified  Insurance:    Payor/Plan Subscriber Name Rel Member # Group #   [employer] - [employer] [place] JIMMY SWENSON  [id number]       [id number]       Discharge Plan   Patient and family discharge goal:  Home  Provided education on discharge plan:  YES  Patient agreeable to discharge plan:  YES  A list of Medicare Certified Facilities was  "provided to the patient and/or family to encourage patient choice. Patient's choices for facility are:  Not done. No TCU recs and pt states \"I wouldn't go anyway.\"  Will NH provide Skilled rehabilitation or complex medical:  NO  General information regarding anticipated insurance coverage and possible out of pocket cost was discussed. Patient and patient's family are aware patient may incur the cost of transportation to the facility, pending insurance payment: NO  Barriers to discharge:  Medical readiness    Discharge Recommendations   Anticipated Disposition:  Home, no needs identified  Transportation Needs:  Family:  Family will drive pt  Name of Transportation Company and Phone:  N/A    Additional comments   Pt seen at bedside with 5 adult family members, 1 child. Pt states she feels fine and wants to go home as soon as possible. She has extensive family support and family members all confirm she will have as much help as she will accept. Pt and family deny acute psychosocial concerns at this time, SW will remain available if needs arise.    CLAUDIA Ram, [facility]   Pager: [phone]      "

## 2019-04-12 NOTE — PLAN OF CARE
Neuro- Pt A & O x 4. Able to move all extremities and follow commands. Speech at times is slurred and slow.Pupils 3-4 mm, PERRLA. Photophobia and glasses use at baseline, no complaints of blurry/ double vision.Able to make needs known, improving from when arrived. Strengths in all extremities +5. No complaints of numbness or tingling, sensation equal. Pt complained about headache after using bedside commode, with assist of 2 RNs for safety. Headache resolved 30 minutes later. Overall improving-slight facial droop and improved weakness on R side. IND repositioning   CV-  NSR. HR 70s. MAPs > 60, maintained.  Pulm-  RA, LS--clear  GI-   NPO until swallow screen. BS upon arrive 69- IV dextrose given,  after recheck.   -  Pt refused to use bedpan around 0345, become extremely agitated with caregivers when educating why bedrest is important. Pt refused and insisted on commode. MD at bedside, gave verbal okay to use commode. Pt voided without issue, with an assist of A1.   Gtts-   NS @ 75  Skin-  Intact, no issues.  Pain-  denies  IV's/Drains-  R PIV infusing.  See flow sheets for further interventions and assessments.   A: Stable   P: Continue to monitor pt closely. Notify MD of significant changes.

## 2019-04-12 NOTE — PLAN OF CARE
OT: OT orders received, reviewed and completed. Pt getting up to bathroom w/ RN when OT entered the room. OT observed pt ambulate ind w/ and then w/o holding IV pole. BUE 5/5, no cognitive concerns noted. OT will complete orders and cancel OT eval. Thank you for the referral.

## 2019-04-12 NOTE — ED PROVIDER NOTES
"  History     Chief Complaint   Patient presents with     One-sided Weakness     HPI  Aundrea Swenson is a 48 year old female with a history of coronary artery disease and possible history of previous stroke presenting for evaluation of acute onset of left-sided weakness.  Patient reports symptoms began abruptly around 11 PM while at the bar.  She reports some slurred speech and weakness in her left side.  Was evaluated by EMS who found left-sided facial droop and left arm drift.  Patient also with some left leg weakness associated with chronic knee injury, unclear if this is worse than baseline.  Denies any headache or vision changes.  Does report some numbness in the left arm and face.  Patient is a chronic smoker.  Did have some alcohol tonight.    Allergies:  Allergies   Allergen Reactions     Atorvastatin Muscle Pain (Myalgia)     Cramps in calves     Bee Venom      Codeine      Penicillins      Strawberry        Problem List:    Patient Active Problem List    Diagnosis Date Noted     Alcohol abuse, episodic drinking behavior 12/16/2013     Priority: Medium     Intermittent alcohol consumption that has resulted in increased agitation, worsening of chronic chest pain and acute coronary syndrome rule out admission at [facility] and [place] (12/2013).       CAD (coronary artery disease) 12/16/2013     Priority: Medium      - \"mild coronary artery disease\" found on angioplasty during chest pain work up at [facility] in 2010   - negative stress test 2010       Tobacco abuse 11/01/2013     Priority: Medium     Health Care Home 12/27/2012     Priority: Medium     Tier 0  DX V65.8 REPLACED WITH 00322 HEALTH CARE HOME (04/08/2013)       Prinzmetal angina (H) 12/27/2012     Priority: Medium        Past Medical History:    Past Medical History:   Diagnosis Date     CAD (coronary artery disease) ~2005       Past Surgical History:    No past surgical history on file.    Family History:    No family history on file.    Social " History:  Marital Status:   [4]  Social History     Tobacco Use     Smoking status: Current Every Day Smoker     Tobacco comment: pt in process of quitting per pt.   Substance Use Topics     Alcohol use: Not on file     Drug use: Not on file        Medications:      aspirin 81 MG tablet   cyclobenzaprine (FLEXERIL) 10 MG tablet   diclofenac (VOLTAREN) 50 MG EC tablet   EPINEPHrine (EPIPEN) 0.3 MG/0.3ML injection   lidocaine (LIDODERM) 5 % patch   naproxen (NAPROSYN) 500 MG tablet   nicotine (NICODERM CQ) 7 MG/24HR patch 2h hr   nitroglycerin (NITROSTAT) 0.4 MG SL tablet   oxyCODONE IR (ROXICODONE) 5 MG tablet         Review of Systems   Constitutional: Negative for appetite change, chills, fatigue and fever.   HENT: Negative for congestion, sore throat, trouble swallowing and voice change.    Eyes: Negative for visual disturbance.   Respiratory: Negative for cough, chest tightness and shortness of breath.    Cardiovascular: Negative for chest pain.   Gastrointestinal: Negative for abdominal pain, nausea and vomiting.   Genitourinary: Negative for dysuria.   Musculoskeletal: Negative for back pain, neck pain and neck stiffness.   Skin: Negative for rash.   Neurological: Positive for weakness and numbness. Negative for light-headedness and headaches.        Left sided  Some slurring of speech   Psychiatric/Behavioral: Negative for confusion.   All other systems reviewed and are negative.      Physical Exam   BP: 141/85  Pulse: 75  Heart Rate: 90  Temp: 97.9  F (36.6  C)  Resp: 20  Weight: 84.8 kg (187 lb)  SpO2: 100 %      Physical Exam   Constitutional: She is oriented to person, place, and time. She appears well-developed and well-nourished. No distress.   HENT:   Head: Normocephalic and atraumatic.   Eyes: Pupils are equal, round, and reactive to light. Conjunctivae are normal.   Cardiovascular: Normal rate and regular rhythm.   Pulmonary/Chest: Effort normal.   Abdominal: Soft. There is no tenderness.    Musculoskeletal:   Weakness left arm and leg   Neurological: She is alert and oriented to person, place, and time. A cranial nerve deficit (left facial droop, rightward tongue deviation, decreased facial sensation) and sensory deficit is present. Coordination abnormal.   Skin: Skin is warm and dry. Capillary refill takes less than 2 seconds. She is not diaphoretic.   Psychiatric: She has a normal mood and affect.   Nursing note and vitals reviewed.        Trying to protrude tongue straight out of mouth      ED Course        Procedures               EKG Interpretation:      Interpreted by Manuel Stokes  Time reviewed: 115  Symptoms at time of EKG: stroke   Rhythm: normal sinus   Rate: Normal  Axis: Normal  Ectopy: none  Conduction: normal  ST Segments/ T Waves: No acute ischemic changes  Q Waves: none  Comparison to prior: No old EKG available    Clinical Impression: Sinus rhythm without acute ischemic abnormality      The patient has stroke symptoms:           ED Stroke specific documentation           NIHSS PDF          Protocol PDF     Patient last known well time: 2330  ED Provider first to bedside at: upon ems arrival ~1245  CT Results received at: 1:22 AM  Patient was treated with TPA.  The risks and benefits of TPA were reviewed using the risk information document.  These risks included bleeding complications such as intracranial bleeding and death. The patient or patient's surrogate's decisional capacity was assessed to be intact. TPA decision was made after this informed consent.    National Institutes of Health Stroke Scale (Baseline)  Time Performed: upon arrival      Score    Level of consciousness: (0)   Alert, keenly responsive    LOC questions: (0)   Answers both questions correctly    LOC commands: (0)   Performs both tasks correctly    Best gaze: (0)   Normal    Visual: (0)   No visual loss    Facial palsy: (2)   Partial paralysis (total/near total of lower face)    Motor arm (left): (2)    Some effort against gravity    Motor arm (right): (0)   No drift    Motor leg (left): (1)   Drift    Motor leg (right): (0)   No drift    Limb ataxia: (1)   Present in one limb    Sensory: (1)   Mild to moderate sensory loss    Best language: (0)   Normal- no aphasia    Dysarthria: (1)   Mild to moderate dysarthria    Extinction and inattention: (0)   No abnormality        Total Score:  8        Stroke Mimics were considered (including migraine headache, seizure disorder, hypoglycemia (or hyperglycemia), head or spinal trauma, CNS infection, Toxin ingestion and shock state (e.g. sepsis) .                 Results for orders placed or performed during the hospital encounter of 04/12/19 (from the past 24 hour(s))   CBC with platelets differential   Result Value Ref Range    WBC 8.4 4.0 - 11.0 10e9/L    RBC Count 4.90 3.8 - 5.2 10e12/L    Hemoglobin 14.2 11.7 - 15.7 g/dL    Hematocrit 45.3 35.0 - 47.0 %    MCV 92 78 - 100 fl    MCH 29.0 26.5 - 33.0 pg    MCHC 31.3 (L) 31.5 - 36.5 g/dL    RDW 13.0 10.0 - 15.0 %    Platelet Count 214 150 - 450 10e9/L    Diff Method Automated Method     % Neutrophils 46.5 %    % Lymphocytes 44.7 %    % Monocytes 5.7 %    % Eosinophils 2.3 %    % Basophils 0.6 %    % Immature Granulocytes 0.2 %    Nucleated RBCs 0 0 /100    Absolute Neutrophil 3.9 1.6 - 8.3 10e9/L    Absolute Lymphocytes 3.8 0.8 - 5.3 10e9/L    Absolute Monocytes 0.5 0.0 - 1.3 10e9/L    Absolute Eosinophils 0.2 0.0 - 0.7 10e9/L    Absolute Basophils 0.1 0.0 - 0.2 10e9/L    Abs Immature Granulocytes 0.0 0 - 0.4 10e9/L    Absolute Nucleated RBC 0.0    INR   Result Value Ref Range    INR 0.91 0.86 - 1.14   Partial thromboplastin time   Result Value Ref Range    PTT 26 22 - 37 sec   Troponin I   Result Value Ref Range    Troponin I ES <0.015 0.000 - 0.045 ug/L   Comprehensive metabolic panel   Result Value Ref Range    Sodium 143 133 - 144 mmol/L    Potassium 3.0 (L) 3.4 - 5.3 mmol/L    Chloride 111 (H) 94 - 109 mmol/L     Carbon Dioxide 22 20 - 32 mmol/L    Anion Gap 10 3 - 14 mmol/L    Glucose 88 70 - 99 mg/dL    Urea Nitrogen 15 7 - 30 mg/dL    Creatinine 0.75 0.52 - 1.04 mg/dL    GFR Estimate >90 >60 mL/min/[1.73_m2]    GFR Estimate If Black >90 >60 mL/min/[1.73_m2]    Calcium 9.0 8.5 - 10.1 mg/dL    Bilirubin Total 0.1 (L) 0.2 - 1.3 mg/dL    Albumin 4.0 3.4 - 5.0 g/dL    Protein Total 7.5 6.8 - 8.8 g/dL    Alkaline Phosphatase 69 40 - 150 U/L    ALT 39 0 - 50 U/L    AST 23 0 - 45 U/L   Glucose by meter   Result Value Ref Range    Glucose 67 (L) 70 - 99 mg/dL       Medications   alteplase (ACTIVASE) infusion 8 mg (0 mg Intravenous Stopped 4/12/19 0157)     Followed by   alteplase (ACTIVASE) infusion 68 mg (68 mg Intravenous New Bag 4/12/19 0157)     Followed by   0.9% sodium chloride BOLUS (has no administration in time range)   0.9% sodium chloride BOLUS (500 mLs Intravenous New Bag 4/12/19 0112)   iopamidol (ISOVUE-370) solution 70 mL (70 mLs Intravenous Given 4/12/19 0106)   sodium chloride 0.9 % bag 500mL for CT scan flush use (100 mLs As instructed Given 4/12/19 0106)   midazolam (VERSED) injection 2 mg (2 mg Intravenous Given 4/12/19 0134)     1:17 AM: Pt re-assessed. Unchanged symptoms. Paging stroke neuro at the . No visible hemorrhage on CT by my read. Formal read pending.     1:22 AM: Discussed with Dr Souza, radiology re: ct head: Both CT and CTA negative for evidence of acute stroke.     1:28 AM: Re-paged stroke neuro at the .     1:30 AM: Discussed with Dr Felipe, stroke neuro. Recommends TPA.     1:49 AM: I had a prolonged conversation with the patient and  regarding TPA risks and benefits. Patient and family questions addressed. Pt aware of ~5% chance of bleeding and possibly making stroke symptoms worse. She would like to proceed with TPA despite risks.     2:12 AM: EMS at bedside for transfer.  No significant change in symptoms.  TPA running.    Assessments & Plan (with Medical Decision  Making)  48-year-old female with history of coronary artery disease presenting for evaluation of acute onset of left facial weakness, slurred speech, and left arm weakness.  Symptoms first noticed around 11:30 PM.  Patient was out with her  at a bar and has had several alcoholic drinks.  Patient brought in by EMS as a stroke code.  Upon arrival, patient has notable neuro deficit with left-sided weakness, likely both upper and lower extremity.  Left facial droop with slurred speech.  Patient does have an abnormal finding of rightward tongue deviation when she is asked to protrude her tongue straight out.  Denies vision changes.  Alert and oriented but does have some mild apparent confusion/short-term memory recall issues currently.  Uncertain if this is secondary to alcohol or stroke related symptoms.  Patient sent over for acute stroke evaluation with CT and CTA.  No acute abnormality seen on either per radiology.  Case discussed with neurologist Dr. Felipe at the [facility] who recommended initiating TPA given her relatively extensive deficit despite absence of acute clot seen on CT given a small clots are not always visualized on CT.  Patient is overall a low risk for bleeding.  Her blood pressure is well controlled.  No recent surgery or trauma.  She is on low-dose aspirin for her heart disease.  I had a prolonged discussion with the patient and her spouse regarding the use of TPA.  After reviewing risks and benefits multiple times, patient agreed to proceed with TPA.  Patient drained and reported emergently to the [facility] for further management and care of her presumed acute stroke with left-sided deficit.     I have reviewed the nursing notes.    I have reviewed the findings, diagnosis, plan and need for follow up with the patient.     Critical Care Addendum    My initial assessment, based on my review of prehospital provider report, review of vital signs, focused history and  physical exam, established that Aundrea Swenson has focal neurologic abnormalities, which requires immediate intervention, and therefore she is critically ill.     After the initial assessment, the care team initiated multiple lab tests and initiated medication therapy with TPA to provide stabilization care. Due to the critical nature of this patient, I reassessed vital signs, physical exam, mental status, neurologic status and respiratory status multiple times prior to her disposition.     Time also spent performing documentation, discussion with family to obtain medical information for decision making, reviewing test results, discussion with consultants and coordination of care.     Critical care time (excluding teaching time and procedures): 55 minutes.        Medication List      There are no discharge medications for this visit.         Final diagnoses:   Cerebrovascular accident (CVA), unspecified mechanism (H) - Left facial and arm weakness/numbness       4/12/2019   [facility] EMERGENCY DEPARTMENT     Stokes, Manuel Chadwick MD  04/12/19 0219

## 2019-04-12 NOTE — CONSULTS
"Smoking Cessation Consult   2019    Patient: Aundrea Swenson      :  1970                    MRN:[id number]      STROKE  Stroke (cerebrum) (H)     Asked patient if she would be interested in sharing about her tobacco use and in learning about more options and resources for quitting, staying quit, and in developing a relapse prevention plan.     Patient declines tobacco cessation counseling and  any intervention this time. Patient not willing to discuss current tobacco use. Stated, \"I don't want to get into all of that right now. My last cigarette was yesterday\".    Will sign off at this time.  Thank you for the consult.    Soraida Addison, RRT, CTTS  Chronic Pulmonary Disease Specialist  Office: [phone]  Pager: [phone]                "

## 2019-04-13 VITALS
BODY MASS INDEX: 31.73 KG/M2 | WEIGHT: 190.7 LBS | DIASTOLIC BLOOD PRESSURE: 72 MMHG | HEART RATE: 86 BPM | SYSTOLIC BLOOD PRESSURE: 113 MMHG | TEMPERATURE: 98 F | RESPIRATION RATE: 20 BRPM | OXYGEN SATURATION: 97 %

## 2019-04-13 LAB
CHOLEST SERPL-MCNC: 251 MG/DL
ERYTHROCYTE [DISTWIDTH] IN BLOOD BY AUTOMATED COUNT: 13.2 % (ref 10–15)
GLUCOSE SERPL-MCNC: 108 MG/DL (ref 70–99)
HCT VFR BLD AUTO: 41.2 % (ref 35–47)
HDLC SERPL-MCNC: 41 MG/DL
HGB BLD-MCNC: 12.7 G/DL (ref 11.7–15.7)
LDLC SERPL CALC-MCNC: 169 MG/DL
MCH RBC QN AUTO: 29.7 PG (ref 26.5–33)
MCHC RBC AUTO-ENTMCNC: 30.8 G/DL (ref 31.5–36.5)
MCV RBC AUTO: 96 FL (ref 78–100)
NONHDLC SERPL-MCNC: 210 MG/DL
PLATELET # BLD AUTO: 175 10E9/L (ref 150–450)
RBC # BLD AUTO: 4.28 10E12/L (ref 3.8–5.2)
TRIGL SERPL-MCNC: 206 MG/DL
WBC # BLD AUTO: 7.1 10E9/L (ref 4–11)

## 2019-04-13 PROCEDURE — 80061 LIPID PANEL: CPT | Performed by: PSYCHIATRY & NEUROLOGY

## 2019-04-13 PROCEDURE — 25000132 ZZH RX MED GY IP 250 OP 250 PS 637: Performed by: STUDENT IN AN ORGANIZED HEALTH CARE EDUCATION/TRAINING PROGRAM

## 2019-04-13 PROCEDURE — 82947 ASSAY GLUCOSE BLOOD QUANT: CPT | Performed by: PSYCHIATRY & NEUROLOGY

## 2019-04-13 PROCEDURE — 36415 COLL VENOUS BLD VENIPUNCTURE: CPT | Performed by: PSYCHIATRY & NEUROLOGY

## 2019-04-13 PROCEDURE — 85027 COMPLETE CBC AUTOMATED: CPT | Performed by: PSYCHIATRY & NEUROLOGY

## 2019-04-13 PROCEDURE — 25000132 ZZH RX MED GY IP 250 OP 250 PS 637: Performed by: PSYCHIATRY & NEUROLOGY

## 2019-04-13 RX ORDER — NICOTINE 21 MG/24HR
1 PATCH, TRANSDERMAL 24 HOURS TRANSDERMAL EVERY 24 HOURS
Qty: 14 PATCH | Refills: 0 | Status: SHIPPED | OUTPATIENT
Start: 2019-04-13 | End: 2019-09-18

## 2019-04-13 RX ADMIN — ASPIRIN 81 MG CHEWABLE TABLET 81 MG: 81 TABLET CHEWABLE at 08:09

## 2019-04-13 RX ADMIN — ACETAMINOPHEN 650 MG: 325 TABLET, FILM COATED ORAL at 08:09

## 2019-04-13 ASSESSMENT — VISUAL ACUITY
OU: BASELINE

## 2019-04-13 ASSESSMENT — ACTIVITIES OF DAILY LIVING (ADL)
ADLS_ACUITY_SCORE: 18

## 2019-04-13 NOTE — PLAN OF CARE
DISCHARGE   Discharged to: Home  Via: Automobile  Accompanied by: Family  Discharge Instructions: diet, activity, medications, follow up appointments, when to call the MD, and what to watchout for (i.e. s/s of stroke, effects of TPA)  Prescriptions: To be filled by home pharmacy per pt's request; medication list reviewed & sent with pt  Follow Up Appointments: arranged; information given  Belongings: All sent with pt  IV: out  Telemetry: off  Pt exhibits understanding of above discharge instructions; all questions answered.  Discharge Paperwork: signed by patient and given    Neuros WNL. Discussed  stroke materials, follow-up with provider to discuss lipid panel, options of helping to quite smoking.

## 2019-04-13 NOTE — PLAN OF CARE
D/I: Patient on unit 4A Surgical/Neuro ICU   Neuro-  A/Ox4. PERRL. Photophobia. Equal 5/5 strength all extremities. Baseline LLE numbness and baseline L hearing loss.   CV- HR stable. BP soft this am; MD notified. Tele: NSR  Pulm- LS clear. Satting high 90s on RA  GI- BS active  - Voiding adequately per commode/BR  Gtts- None  Skin- No issues noted  Pain- PRN tylenol x1 effective for L knee pain  IV's - New left PIV saline locked  See flow sheets for further interventions and assessments.   A: Stable   P: Continue to monitor pt closely. Notify MD of significant changes

## 2019-04-15 NOTE — DISCHARGE SUMMARY
"[facility], [place]    Neurology Stroke Discharge Summary    Date of Admission: 4/12/2019  Date of Discharge: 4/13/2019    Disposition: Discharged to home  Primary Care Physician: Lefty Hdz      Admission Diagnosis:   Acute ischemic stroke status post TPA    Discharge Diagnosis:   Alcohol intoxication, resolved  Locus  Minoris    Problem Leading to Hospitalization (from [facility]):   By Dr Cazares 4/12:   \"Ms Aundrea Swenson is a 48 year old woman with a PMHx of CAD who presented to [facility] ED with acute onset left sided weakness. Patient reports that symptoms began at 2300 while at the bar. She reported some slurred speech and weakness on her left side. She was then evaluated by EMS who found left sided facial droop and LUE drift. She also reported numbness in the left arm as well as nausea and dizziness. Reports that she has had similar symptoms (left facial droop, left sided weakness, slurring of speech approximately 1 year ago and was evaluated at [facility] and was told she had had a seizure or TIA. She reports that her symptoms at that time lasted for two days, however  reports that symptoms had lasted for 45 minutes.      Reports chronic knee/leg pain due to ACL tear with sensory disturbances in the left leg as well as weakness. However, she does not use a walker/cane.\"       Please see H&P dated 4/12/2019 for further details about presentation.    Brief Hospital Course:   Patient presented to [facility] emergency department with acute onset left-sided weakness and was administered IV TPA through tele-stroke and then transferred to [facility].  When she arrived here an MRI brain stroke series was performed which demonstrated no area of acute infarct, nor did it demonstrate any old infarcts.  Blood alcohol level was 0.08 which was taken 30 in the morning, several hours after her presentation to [facility] emergency department at roughly 11 PM.   She was worked up " according to standard stroke protocol including a TTE with bubble study, A1c, lipid panel and was monitored in the neuro intensive care unit for greater than 24 hours since she had received IV TPA.     Her labs are notable for hyperlipidemia with an LDL of 169, a normal A1c of 5.0, and a TTE that did show a patent foramen ovale.  This PFO is present in roughly 20% of the population, and since Ms Swenson did not have a stroke there is no indication to either treat her further evaluate this PFO.  She had been prescribed a daily aspirin after she was diagnosed with what was thought to be Prinzmetal angina, though she has not been particularly compliant with the aspirin and was only taking it in response to angina.  Here she was advised to take 81 mg of aspirin every day regardless of her symptoms.    Found to have no acute stroke.    IV tPA was administered prior to arrival.      Work-up as stated below under Pertinent Investigations.    Etiology is thought to be:  No acute stroke.    Rehab evaluation: No rehab services required due to lack of ongoing deficit.     Smoking Cessation: education provided    BP Long-term Goal: 140/90 or less    Antithrombotic/Anticoagulant Agent: aspirin 81 mg    Statins: No indication for statin given no acute stroke or evidence of old stroke.       Hgb A1C Goal: < 7.0    Complications: None.     Other problems addressed during this hospitalization:  Acute intoxication  Tobacco use disorder    PERTINENT INVESTIGATIONS    Labs  Lipid Panel:   Recent Labs   Lab Test 04/13/19  0337   CHOL 251*   HDL 41*   *   TRIG 206*     A1C:   Lab Results   Component Value Date    A1C 5.0 04/12/2019     INR:   Recent Labs   Lab 04/12/19  0640 04/12/19  0105   INR 0.95 0.91      Coag Panel / Hypercoag Workup: Not indicated  Pending test results: None    Echo:  TTE 4/1219:  Interpretation Summary  Bubble study positive for R-L shunting at rest and more significant R-L shunt  during Valsalva  maneuver.     Global and regional left ventricular function is normal with an EF of 60-65%.  The right ventricle is normal size.  Global right ventricular function is normal.  No valvular dysfunction.  No pericardial effusion is present.  There is no prior study for direct comparison.    Imaging:   I reviewed these images personally  MRI Brain 4/12/19  Impression:  No evidence of acute infarction or intracranial hemorrhage.    Endovascular procedure: None     Cardiac Monitoring: Patient had > 24 hrs of cardiac monitor while in hospital.    Findings: No atrial fibrillation was found.    Sleep Apnea Screen:   Questions/Answers      1. Prior to your stroke, have you been told that you snore? Yes.    2. Prior to your stroke, have you been told that you struggle to breath while you are sleeping? No.    3. Prior to your stroke, do you feel tired and sleepy even after getting a normal night of sleep? No.    Sleep Apnea Screen Findings: Patient has 0-1 symptoms of sleep apnea.  Further sleep study is not recommended at this time.    PHQ-9 Depression Screen Score:   Not assessed: Patient did not have an acute stroke     Education discussed with: patient and spouse on medical management, smoking cessation plan and alcohol cessation .    During daily rounds, the plan of care was discussed and developed with patient and spouse.  Plan of care includes: admission, imaging studies, lab investigations, daily aspirin.    PHYSICAL EXAMINATION  Vital Signs:  B/P: 113/72, T: 98, P: 86, R: 20    Physical Examination:  /72   Pulse 86   Temp 98  F (36.7  C) (Oral)   Resp 20   Wt 86.5 kg (190 lb 11.2 oz)   SpO2 97%   BMI 31.73 kg/m    General: NAD, lying in bed   HEENT: Atraumatic, normocephalic.   CV: RRR, no m/r/g. No JVD.   Resp: Symmetric respirations. No use of accessory muscles.CTAB, no wheezes or rhonchi.   Abd: Soft, nontender, nondistended   Skin: No rashes or lesions.  Extremities: WWP, No edema. Kimberley's sign  negative. No calf tenderness bilaterally    Neurological Examination  Mental status: awake, alert, attentive, oriented to self, time, place, and circumstance. Language fluent. Recent and remote memory intact.  CN: VFF, PERRL, EOMI, face symmetric, facial sensation intact and symmetric, tongue and uvula midline, shoulder shrug intact.  Motor: moves 4/4 extremities antigravity, normal tone, no abnormal movements  Sensory: intact to light touch in 4/4 extremities  Reflexes: 2+ and symmetric in bilateral biceps, brachioradialis, patellae and achilles. No clonus, toes downgoing.  Coordination and gait: FNF and HS without ataxia or dysmetria. Gait not assessed.     National Institutes of Health Stroke Scale (on day of discharge)  NIHSS Total Score: 0    Modified Layla Scale (on day of discharge): 0-No symptoms    Medications    Discharge Medication List as of 4/13/2019  8:59 AM      START taking these medications    Details   nicotine (NICODERM CQ) 21 MG/24HR 24 hr patch Place 1 patch onto the skin every 24 hours for 14 days, Disp-14 patch, R-0, E-Prescribe         CONTINUE these medications which have NOT CHANGED    Details   aspirin 81 MG tablet Take 1 tablet by mouth daily., 1 tablet, Oral, DAILY, Until Discontinued, Historical      cyclobenzaprine (FLEXERIL) 10 MG tablet Take 1 tablet (10 mg) by mouth 3 times daily as needed, Disp-20 tablet, R-1, E-Prescribe      diclofenac (VOLTAREN) 50 MG EC tablet Take 1 tablet (50 mg) by mouth 3 times daily, Disp-90 tablet, R-1, E-Prescribe      EPINEPHrine (EPIPEN) 0.3 MG/0.3ML injection Inject 0.3 mls into the muscle once as needed., Disp-2 each, R-1, E-Prescribe      lidocaine (LIDODERM) 5 % patch Apply 1 patches to effected area and leave in place for 12hours . Than remove and leave off for 12 hours.Disp-30 patch, [id number]      naproxen (NAPROSYN) 500 MG tablet Take 1 tablet (500 mg) by mouth every 12 hours, Disp-100 tablet, R-0, E-Prescribe      nicotine (NICODERM CQ)  7 MG/24HR patch 2h hr Place 1 patch onto the skin every 24 hoursDisp-30 patch, [id number]      nitroglycerin (NITROSTAT) 0.4 MG SL tablet Place 1 tablet (0.4 mg) under the tongue every 5 minutes as needed Up to 3 dose, call 911 if pain persists, Disp-25 tablet, R-0, E-Prescribe      oxyCODONE IR (ROXICODONE) 5 MG tablet Take 1 tablet (5 mg) by mouth every 6 hours as needed for pain, Disp-12 tablet, R-0, Local Print             Additional recommendations and follow up:       Reason for your hospital stay    You had weakness on one side of your body that started acutely which was consistent with a stroke. You were treated with a clot busting medication., MRI revealed that you actually did not have a stroke. Your weakness may have been caused by consuming alcohol.     Adult [facility] Follow-up and recommended labs and tests    Follow up with primary care provider, Lefty Hdz, within 14 days for hospital follow- up.  No follow up labs or test are needed.      Appointments on [place] and/or [facility] (with [facility] or [facility] provider or service). Call [phone] if you haven't heard regarding these appointments within 7 days of discharge.     Activity    Your activity upon discharge: activity as tolerated     Full Code     Diet    Follow this diet upon discharge: Orders Placed This Encounter      Regular Diet Adult       Patient was seen and discussed with the Attending, Dr. Nahid Stokes PGY2  Neurology Resident

## 2019-04-18 ENCOUNTER — PATIENT OUTREACH (OUTPATIENT)
Dept: NEUROLOGY | Facility: CLINIC | Age: 49
End: 2019-04-18

## 2019-04-18 NOTE — PROGRESS NOTES
[facility]: Post-Discharge Note  SITUATION                                                      Admission:    Admission Date: 04/12/19   Reason for Admission: Acute ischemic stroke status post TPA  Discharge:   Discharge Date: 04/14/19  Discharge Diagnosis: Alcohol intoxication, resolved; Locus minoris     PLAN                                                      Outpatient Plan:   Take 81 mg aspirin daily. Nicoderm patch prescribed.   Follow up with primary care provider, Lefty Hdz, within 14 days for hospital follow- up.  No follow up labs or test are needed.      No future appointments.    LVMM x 2 for patient to return call with questions/concerns. Sneha Sauceda RN 4/18/2019 10:22 AM       Sneha Sauceda RN

## 2019-08-09 ENCOUNTER — HOSPITAL ENCOUNTER (EMERGENCY)
Facility: CLINIC | Age: 49
Discharge: HOME | End: 2019-08-09
Attending: PHYSICIAN ASSISTANT | Admitting: PHYSICIAN ASSISTANT
Payer: COMMERCIAL

## 2019-08-09 ENCOUNTER — APPOINTMENT (OUTPATIENT)
Dept: GENERAL RADIOLOGY | Facility: CLINIC | Age: 49
End: 2019-08-09
Attending: PHYSICIAN ASSISTANT
Payer: COMMERCIAL

## 2019-08-09 VITALS
TEMPERATURE: 97.8 F | RESPIRATION RATE: 18 BRPM | DIASTOLIC BLOOD PRESSURE: 81 MMHG | OXYGEN SATURATION: 99 % | SYSTOLIC BLOOD PRESSURE: 129 MMHG

## 2019-08-09 DIAGNOSIS — S92.402A CLOSED DISPLACED FRACTURE OF PHALANX OF LEFT GREAT TOE, UNSPECIFIED PHALANX, INITIAL ENCOUNTER: ICD-10-CM

## 2019-08-09 PROCEDURE — 28495 TREAT BIG TOE FRACTURE: CPT

## 2019-08-09 PROCEDURE — 28490 TREAT BIG TOE FRACTURE: CPT | Mod: LT | Performed by: PHYSICIAN ASSISTANT

## 2019-08-09 PROCEDURE — 73630 X-RAY EXAM OF FOOT: CPT | Mod: LT

## 2019-08-09 PROCEDURE — 25000128 H RX IP 250 OP 636: Performed by: PHYSICIAN ASSISTANT

## 2019-08-09 PROCEDURE — 96372 THER/PROPH/DIAG INJ SC/IM: CPT

## 2019-08-09 PROCEDURE — 99213 OFFICE O/P EST LOW 20 MIN: CPT | Mod: 25 | Performed by: PHYSICIAN ASSISTANT

## 2019-08-09 PROCEDURE — G0463 HOSPITAL OUTPT CLINIC VISIT: HCPCS | Mod: 25

## 2019-08-09 PROCEDURE — G0463 HOSPITAL OUTPT CLINIC VISIT: HCPCS | Mod: 25 | Performed by: PHYSICIAN ASSISTANT

## 2019-08-09 PROCEDURE — 99284 EMERGENCY DEPT VISIT MOD MDM: CPT | Mod: 25

## 2019-08-09 RX ORDER — KETOROLAC TROMETHAMINE 30 MG/ML
30 INJECTION, SOLUTION INTRAMUSCULAR; INTRAVENOUS ONCE
Status: COMPLETED | OUTPATIENT
Start: 2019-08-09 | End: 2019-08-09

## 2019-08-09 RX ORDER — OXYCODONE HYDROCHLORIDE 5 MG/1
5 TABLET ORAL EVERY 6 HOURS PRN
Qty: 10 TABLET | Refills: 0 | Status: SHIPPED | OUTPATIENT
Start: 2019-08-09 | End: 2019-08-14

## 2019-08-09 RX ORDER — ONDANSETRON 4 MG/1
4 TABLET, ORALLY DISINTEGRATING ORAL EVERY 8 HOURS PRN
Qty: 10 TABLET | Refills: 0 | Status: SHIPPED | OUTPATIENT
Start: 2019-08-09 | End: 2019-08-12

## 2019-08-09 RX ADMIN — KETOROLAC TROMETHAMINE 30 MG: 30 INJECTION, SOLUTION INTRAMUSCULAR at 19:11

## 2019-08-09 NOTE — ED AVS SNAPSHOT
[facility] Emergency Department  [address] [address]  Phone:  [phone]  Fax:  [phone]                                    Aundrea Swenson   MRN: [id number]    Department:  [facility] Emergency Department   Date of Visit:  8/9/2019           After Visit Summary Signature Page    I have received my discharge instructions, and my questions have been answered. I have discussed any challenges I see with this plan with the nurse or doctor.    ..........................................................................................................................................  Patient/Patient Representative Signature      ..........................................................................................................................................  Patient Representative Print Name and Relationship to Patient    ..................................................               ................................................  Date                                   Time    ..........................................................................................................................................  Reviewed by Signature/Title    ...................................................              ..............................................  Date                                               Time          22EPIC Rev 08/18

## 2019-08-10 ASSESSMENT — ENCOUNTER SYMPTOMS: CONSTITUTIONAL NEGATIVE: 1

## 2019-08-10 NOTE — ED PROVIDER NOTES
"  History     Chief Complaint   Patient presents with     Toe Injury     left great toe     HPI  Aundrea Swenson is a 49 year old female with hx CVA, alcohol abuse, CAD who presents with complaints of left great toe pain today.  Pt states a trailer hitch came down and landed on her great toe while she and her  were moving it.  She was wearing canvas shoes at the time.  Pt has had severe pain throughout her great toe since that time.  The toe is swollen and bruised.  Pt states she took an old Oxycodone she had a home without improvement in her pain.  Pt states the pain was so great, she threw up prior to arrival and has felt nauseous since.      Allergies:  Allergies   Allergen Reactions     Bee Venom Shortness Of Breath and Rash     Atorvastatin Muscle Pain (Myalgia)     Cramps in calves     Chantix [Varenicline]      Became aggressive     Codeine Swelling and Rash     Penicillins Swelling and Rash     [place] Swelling and Rash       Problem List:    Patient Active Problem List    Diagnosis Date Noted     Stroke (cerebrum) (H) 04/12/2019     Priority: Medium     Alcohol abuse, episodic drinking behavior 12/16/2013     Priority: Medium     Intermittent alcohol consumption that has resulted in increased agitation, worsening of chronic chest pain and acute coronary syndrome rule out admission at [facility] and [place] (12/2013).       CAD (coronary artery disease) 12/16/2013     Priority: Medium      - \"mild coronary artery disease\" found on angioplasty during chest pain work up at [facility] in 2010   - negative stress test 2010       Tobacco abuse 11/01/2013     Priority: Medium     Health Care Home 12/27/2012     Priority: Medium     Tier 0  DX V65.8 REPLACED WITH 06990 [facility]ALTH CARE HOME (04/08/2013)       Prinzmetal angina (H) 12/27/2012     Priority: Medium        Past Medical History:    Past Medical History:   Diagnosis Date     CAD (coronary artery disease) ~2005       Past Surgical History:    History " reviewed. No pertinent surgical history.    Family History:    No family history on file.    Social History:  Marital Status:   [4]  Social History     Tobacco Use     Smoking status: Current Every Day Smoker     Tobacco comment: pt in process of quitting per pt.   Substance Use Topics     Alcohol use: None     Drug use: None        Medications:      ondansetron (ZOFRAN ODT) 4 MG ODT tab   oxyCODONE (ROXICODONE) 5 MG tablet   aspirin 81 MG tablet   cyclobenzaprine (FLEXERIL) 10 MG tablet   diclofenac (VOLTAREN) 50 MG EC tablet   EPINEPHrine (EPIPEN) 0.3 MG/0.3ML injection   lidocaine (LIDODERM) 5 % patch   naproxen (NAPROSYN) 500 MG tablet   nicotine (NICODERM CQ) 7 MG/24HR patch 2h hr   nitroglycerin (NITROSTAT) 0.4 MG SL tablet   oxyCODONE IR (ROXICODONE) 5 MG tablet         Review of Systems   Constitutional: Negative.    Musculoskeletal:        Left great toe pain and swelling   Skin:        Left great toe bruising   All other systems reviewed and are negative.      Physical Exam   BP: 129/81  Heart Rate: 98  Temp: 97.8  F (36.6  C)  Resp: 18  SpO2: 99 %      Physical Exam   Constitutional: She appears well-developed and well-nourished. She appears distressed.   HENT:   Head: Normocephalic and atraumatic.   Eyes: Conjunctivae are normal.   Neck: Neck supple.   Cardiovascular: Intact distal pulses.   Pulmonary/Chest: Effort normal.   Musculoskeletal:        Left ankle: Normal.        Left foot: There is decreased range of motion, tenderness, bony tenderness and swelling. There is normal capillary refill, no crepitus and no laceration.   Diffuse tenderness, swelling, and ecchymosis to left great toe.  Toenail is intact.  Tenderness extends along distal great toe metatarsal.  Tenderness along left 2nd toe as well.  No breaks in the skin.   Neurological: She is alert. She has normal strength. No sensory deficit.   Skin: Skin is warm and dry.       ED Course        Procedures    Results for orders placed or  performed during the hospital encounter of 08/09/19 (from the past 24 hour(s))   Foot XR, G/E 3 views, left    Narrative    FOOT THREE VIEWS LEFT  8/9/2019 7:11 PM     HISTORY: dropped tailgate on great toe, pain and bruising to entire  foot    COMPARISON: None.      Impression    IMPRESSION: Acute comminuted fracture of the first distal phalanx.  Fracture lines extend from the base to the tuft, with intra-articular  extension at the IP joint. There is up to 2.5 mm displacement. Normal  joint alignment maintained. Surrounding soft tissue swelling. Small  plantar calcaneal enthesophyte.    MEHDI TAYLOR MD       Medications   ketorolac (TORADOL) injection 30 mg (30 mg Intramuscular Given 8/9/19 1911)       Assessments & Plan (with Medical Decision Making)     Pt is a 49 year old female with hx CVA, alcohol abuse, CAD who presents with complaints of left great toe pain today.  Pt states a trailer hitch came down and landed on her great toe while she and her  were moving it.  She was wearing canvas shoes at the time.  Pt has had severe pain throughout her great toe since that time.  The toe is swollen and bruised.  Pt is afebrile on arrival.  Exam as above.  X-rays of left foot show a comminuted fracture of the great toe distal phalanx with displacement of the fracture.  Discussed results with patient.  Pt was fitted with a cam boot and provided with crutches.  Return precautions were reviewed.  Hand-outs were provided.    Patient was sent with Oxycodone for severe pain as well as Zofran and was instructed to follow-up with podiatry in 3-5 days for continued care and management or sooner if new or worsening symptoms.  She is to return to the ED for persistent and/or worsening symptoms.  Patient expressed understanding of the diagnosis and plan and was discharged home in good condition.    I have reviewed the nursing notes.    I have reviewed the findings, diagnosis, plan and need for follow up with the  patient.    Discharge Medication List as of 8/9/2019  7:48 PM      START taking these medications    Details   !! oxyCODONE (ROXICODONE) 5 MG tablet Take 1 tablet (5 mg) by mouth every 6 hours as needed for pain, Disp-10 tablet, R-0, Local Print       !! - Potential duplicate medications found. Please discuss with provider.          Final diagnoses:   Closed displaced fracture of phalanx of left great toe, unspecified phalanx, initial encounter       8/9/2019   [facility] EMERGENCY DEPARTMENT      Disclaimer:  This note consists of symbols derived from keyboarding, dictation and/or voice recognition software.  As a result, there may be errors in the script that have gone undetected.  Please consider this when interpreting information found in this chart.     Lorena Carpenter PA-C  08/10/19 1858

## 2019-08-14 ENCOUNTER — OFFICE VISIT (OUTPATIENT)
Dept: PODIATRY | Facility: CLINIC | Age: 49
End: 2019-08-14
Payer: COMMERCIAL

## 2019-08-14 VITALS
SYSTOLIC BLOOD PRESSURE: 112 MMHG | WEIGHT: 190 LBS | BODY MASS INDEX: 30.53 KG/M2 | DIASTOLIC BLOOD PRESSURE: 66 MMHG | HEIGHT: 66 IN | HEART RATE: 75 BPM

## 2019-08-14 DIAGNOSIS — S92.422A CLOSED DISPLACED FRACTURE OF DISTAL PHALANX OF LEFT GREAT TOE, INITIAL ENCOUNTER: Primary | ICD-10-CM

## 2019-08-14 PROCEDURE — 28495 TREAT BIG TOE FRACTURE: CPT | Mod: 55 | Performed by: PODIATRIST

## 2019-08-14 RX ORDER — OXYCODONE HYDROCHLORIDE 5 MG/1
5 TABLET ORAL EVERY 6 HOURS PRN
Qty: 10 TABLET | Refills: 0 | Status: SHIPPED | OUTPATIENT
Start: 2019-08-14 | End: 2019-10-17

## 2019-08-14 RX ORDER — ACETAMINOPHEN 500 MG
1000 TABLET ORAL
COMMUNITY
Start: 2019-03-26

## 2019-08-14 ASSESSMENT — PAIN SCALES - GENERAL: PAINLEVEL: MODERATE PAIN (5)

## 2019-08-14 ASSESSMENT — MIFFLIN-ST. JEOR: SCORE: 1495.64

## 2019-08-14 NOTE — NURSING NOTE
"Chief Complaint   Patient presents with     Fracture     DOI 08/09/19, XR taken, Closed displaced fracture of phalanx of left great toe       Initial /66   Pulse 75   Ht 1.664 m (5' 5.5\")   Wt 86.2 kg (190 lb)   BMI 31.14 kg/m   Estimated body mass index is 31.14 kg/m  as calculated from the following:    Height as of this encounter: 1.664 m (5' 5.5\").    Weight as of this encounter: 86.2 kg (190 lb).  Medications and allergies reviewed.      Preeti KINSEY MA    "

## 2019-08-14 NOTE — PATIENT INSTRUCTIONS
SMOKING CESSATION  What's in cigarette smoke? - Cigarette smoke contains over 4,000 chemicals. Nicotine is one of the main ingredients which is an insecticide/herbicide. It is poisonous to our nervous system, increases blood clotting risk, and decreases the body's defenses to fight off infection. Another chemical is Carbon Monoxide is an asphyxiating gas that permanently binds to blood cells and blocks the transport of oxygen. This leads to tissue death and decreases your metabolism. Tar is a chemical that coats your lungs and trachea which impairs new oxygen coming in and carbon dioxide getting out of your body.   How does smoking impact surgery? - Smoking is particularly hazardous with regards to surgery. Surgery puts stress on the body and a smoker's body is already under strain from these chemicals. Putting the two together, especially for an elective surgery, could be a recipe for disaster. Smoking before and after surgery increases your risk of heart problems, slow wound healing, delayed bone healing, blood clots, wound infection and anesthesia complications.   What are the benefits of quitting? - In 20 minutes your blood pressure will drop back down to normal. In 8 hours the carbon monoxide (a toxic gas) levels in your blood stream will drop by half, and oxygen levels will return to normal. In 48 hours your chance of having a heart attack will have decreased. All nicotine will have left your body. Your sense of taste and smell will return to a normal level. In 72 hours your bronchial tubes will relax, and your energy levels will increase. In 2 weeks your circulation will increase, and it will continue to improve for the next 10 weeks.    Recommendations for elective surgery - Ideally, patients should quit smoking 8 weeks before and at least 2 weeks after elective surgery in order to avoid complications. Simply cutting back on the amount of cigarettes smoked per day does not offer any benefit or decrease the  risk of poor wound healing, heart problems, and infection. Smokers should also start taking Vitamin C and B for two weeks before surgery and two weeks after surgery.    Ways to Stop Smokin. Nicotine patches, lozenges, or gum  2. Support Groups  3. Medications (see below)    List of Medications:  1. Varenicline Tartrate (CHANTIX)   2. Bupropion HCL (WELLBUTRIN, ZYBAN) - note: make sure Wellbutrin is for smoking cessation and not other issues   3. Nicotine Patch (NICODERM)   4. Nicotine Inhaler (NICOTROL)   5. Nicotine Gum Nicotine Polacrilex   6. Nicotine Lozenge: Nicotine Polacrilex (COMMIT)   * [place] offers a smoking support group as well!  Please visit: [email or web address]  If you are interested in these, ask about getting a prescription or talk to your primary care doctor about what may be the best way for you to quit.     TOE & METATARSAL FRACTURES  The structure of the foot is complex, consisting of bones, muscles, tendons, and other soft tissues. Of the 26 bones in the foot, 19 are toe bones (phalanges) and metatarsal bones (the long bones in the midfoot). Fractures of the toe and metatarsal bones are common and require evaluation by a specialist. A foot and ankle surgeon should be seen for proper diagnosis and treatment, even if initial treatment has been received in an emergency room.  A fracture is a break in the bone. Fractures can be divided into two categories: traumatic fractures and stress fractures.  TRAUMATIC FRACTURES (also called acute fractures) are caused by a direct blow or impact, such as seriously stubbing your toe. Traumatic fractures can be displaced or non-displaced. If the fracture is displaced, the bone is broken in such a way that it has changed in position (dislocated).  Signs and symptoms of a traumatic fracture include:  You may hear a sound at the time of the break.    Pinpoint pain  (pain at the place of impact) at the time the fracture occurs and perhaps  for a few hours later, but often the pain goes away after several hours.   Crooked or abnormal appearance of the toe.   Bruising and swelling the next day.   It is not true that  if you can walk on it, it s not broken.  Evaluation by a foot and ankle surgeon is always recommended.   STRESS FRACTURES are tiny, hairline breaks that are usually caused by repetitive stress. Stress fractures often afflict athletes who, for example, too rapidly increase their running mileage. They can also be caused by an abnormal foot structure, deformities, or osteoporosis. Improper footwear may also lead to stress fractures. Stress fractures should not be ignored. They require proper medical attention to heal correctly.  Symptoms of stress fractures include:  Pain with or after normal activity   Pain that goes away when resting and then returns when standing or during activity    Pinpoint pain  (pain at the site of the fracture) when touched   Swelling, but no bruising   IMPROPER TREATMENT  Some people say that  the doctor can t do anything for a broken bone in the foot.  This is usually not true. In fact, if a fractured toe or metatarsal bone is not treated correctly, serious complications may develop. For example:  A deformity in the bony architecture which may limit the ability to move the foot or cause difficulty in fitting shoes   Arthritis, which may be caused by a fracture in a joint (the juncture where two bones meet), or may be a result of angular deformities that develop when a displaced fracture is severe or hasn t been properly corrected   Chronic pain and deformity   Non-union, or failure to heal, can lead to subsequent surgery or chronic pain.   PROPER TREATMENT FOR TOES  Fractures of the toe bones are almost always traumatic fractures. Treatment for traumatic fractures depends on the break itself and may include these options:  Rest. Sometimes rest is all that is needed to treat a traumatic fracture of the toe.    Splinting. The toe may be fitted with a splint to keep it in a fixed position.   Rigid or stiff-soled shoe. Wearing a stiff-soled shoe protects the toe and helps keep it properly positioned.    Michelet taping  the fractured toe to another toe is sometimes appropriate, but in other cases it may be harmful.   Surgery. If the break is badly displaced or if the joint is affected, surgery may be necessary. Surgery often involves the use of fixation devices, such as pins.   PROPER TREATMENT OF METATARSALS  Breaks in the metatarsal bones may be either stress or traumatic fractures. Certain kinds of fractures of the metatarsal bones present unique challenges.  For example, sometimes a fracture of the first metatarsal bone (behind the big toe) can lead to arthritis. Since the big toe is used so frequently and bears more weight than other toes, arthritis in that area can make it painful to walk, bend, or even stand.  Another type of break, called a Loyd fracture, occurs at the base of the fifth metatarsal bone (behind the little toe). It is often misdiagnosed as an ankle sprain, and misdiagnosis can have serious consequences since sprains and fractures require different treatments. Your foot and ankle surgeon is an expert in correctly identifying these conditions as well as other problems of the foot.  Treatment of metatarsal fractures depends on the type and extent of the fracture, and may include:  Rest. Sometimes rest is the only treatment needed to promote healing of a stress or traumatic fracture of a metatarsal bone.   Avoid the offending activity. Because stress fractures result from repetitive stress, it is important to avoid the activity that led to the fracture. Crutches or a wheelchair are sometimes required to offload weight from the foot to give it time to heal.   Immobilization, casting, or rigid shoe. A stiff-soled shoe or other form of immobilization may be used to protect the fractured bone while it is  healing.   Surgery. Some traumatic fractures of the metatarsal bones require surgery, especially if the break is badly displaced.   Follow-up care. Your foot and ankle surgeon will provide instructions for care following surgical or non-surgical treatment. Physical therapy, exercises and rehabilitation may be included in a schedule for return to normal activities.

## 2019-08-14 NOTE — PROGRESS NOTES
PATIENT HISTORY:  Aundrea Swenson is a 49 year old female who presents to clinic with a chief complaint of a painful left foot.  The patient relates the pain is located on the distal aspect of the great toe on the left foot.  The patient relates injuring the foot on August 9 .  The patient was seen by the ER with x-rays revealing comminuted fracture of the distal phalanx of the left great toe.  The patient was splinted and instructed on protected weight bearing with crutches.    REVIEW OF SYSTEMS:  Constitutional, HEENT, cardiovascular, pulmonary, GI, , musculoskeletal, neuro, skin, endocrine and psych systems are negative, except as otherwise noted.     PAST MEDICAL HISTORY:   Past Medical History:   Diagnosis Date     CAD (coronary artery disease) ~2005    Found on angioplasty at [facility] during w/u for angina        PAST SURGICAL HISTORY: No past surgical history on file.     MEDICATIONS:   Current Outpatient Medications:      acetaminophen (TYLENOL) 500 MG tablet, Take 1,000 mg by mouth, Disp: , Rfl:      aspirin 81 MG tablet, Take 1 tablet by mouth daily., Disp: , Rfl:      diclofenac (VOLTAREN) 50 MG EC tablet, Take 1 tablet (50 mg) by mouth 3 times daily, Disp: 90 tablet, Rfl: 1     EPINEPHrine (EPIPEN) 0.3 MG/0.3ML injection, Inject 0.3 mls into the muscle once as needed., Disp: 2 each, Rfl: 1     lidocaine (LIDODERM) 5 % patch, Apply 1 patches to effected area and leave in place for 12hours . Than remove and leave off for 12 hours., Disp: 30 patch, Rfl: 3     naproxen (NAPROSYN) 500 MG tablet, Take 1 tablet (500 mg) by mouth every 12 hours, Disp: 100 tablet, Rfl: 0     nicotine (NICODERM CQ) 7 MG/24HR patch 2h hr, Place 1 patch onto the skin every 24 hours, Disp: 30 patch, Rfl: 1     nitroglycerin (NITROSTAT) 0.4 MG SL tablet, Place 1 tablet (0.4 mg) under the tongue every 5 minutes as needed Up to 3 dose, call 911 if pain persists, Disp: 25 tablet, Rfl: 0     oxyCODONE (ROXICODONE) 5 MG tablet, Take 1 tablet  (5 mg) by mouth every 6 hours as needed for pain, Disp: 10 tablet, Rfl: 0     cyclobenzaprine (FLEXERIL) 10 MG tablet, Take 1 tablet (10 mg) by mouth 3 times daily as needed (Patient not taking: Reported on 8/14/2019), Disp: 20 tablet, Rfl: 1     ALLERGIES:    Allergies   Allergen Reactions     Bee Venom Shortness Of Breath and Rash     Atorvastatin Muscle Pain (Myalgia)     Cramps in calves     Chantix [Varenicline]      Became aggressive     Codeine Swelling and Rash     Penicillins Swelling and Rash     [place] Swelling and Rash        SOCIAL HISTORY:   Social History     Socioeconomic History     Marital status:      Spouse name: Not on file     Number of children: Not on file     Years of education: Not on file     Highest education level: Not on file   Occupational History     Not on file   Social Needs     Financial resource strain: Not on file     Food insecurity:     Worry: Not on file     Inability: Not on file     Transportation needs:     Medical: Not on file     Non-medical: Not on file   Tobacco Use     Smoking status: Current Every Day Smoker     Smokeless tobacco: Never Used     Tobacco comment: pt in process of quitting per pt.   Substance and Sexual Activity     Alcohol use: Not on file     Drug use: Not on file     Sexual activity: Not on file   Lifestyle     Physical activity:     Days per week: Not on file     Minutes per session: Not on file     Stress: Not on file   Relationships     Social connections:     Talks on phone: Not on file     Gets together: Not on file     Attends Anabaptism service: Not on file     Active member of club or organization: Not on file     Attends meetings of clubs or organizations: Not on file     Relationship status: Not on file     Intimate partner violence:     Fear of current or ex partner: Not on file     Emotionally abused: Not on file     Physically abused: Not on file     Forced sexual activity: Not on file   Other Topics Concern     Not on file  "  Social History Narrative     Not on file        FAMILY HISTORY: No family history on file.     EXAM:Vitals: /66   Pulse 75   Ht 1.664 m (5' 5.5\")   Wt 86.2 kg (190 lb)   BMI 31.14 kg/m    BMI= Body mass index is 31.14 kg/m .  Weight management plan: Patient was referred to their PCP to discuss a diet and exercise plan.    General appearance: Patient is alert and fully cooperative with history & exam.  No sign of distress is noted during the visit.     Psychiatric: Affect is pleasant & appropriate.  Patient appears motivated to improve health.     Respiratory: Breathing is regular & unlabored while sitting.     HEENT: Hearing is intact to spoken word.  Speech is clear.  No gross evidence of visual impairment that would impact ambulation.     Dermatologic: Skin is intact to both lower extremities without significant lesions, rash or abrasion.  No paronychia or evidence of soft tissue infection is noted.     Vascular: DP & PT pulses are intact & regular bilaterally.  No significant edema or varicosities noted.  CFT and skin temperature is normal to both lower extremities.     Neurologic: Lower extremity sensation is intact to light touch.  No evidence of weakness or contracture in the lower extremities.  No evidence of neuropathy.     Musculoskeletal: Patient is non-ambulatory with crutches.  No gross ankle deformity noted.  No foot or ankle joint effusion is noted.    One notes positive edema, positive ecchymosis.  One notes pain with palpation over the dorsal aspect overlying the great toe on the left.    Radiograph review of previous films including non weightbearing AP, lateral and medial oblique views of the left foot reveals an apparent comminuted intra-articular fracture of the distal phalanx of the great toe.  All joint margins appear stable.  There is no apparent tumor formation noted.  There is no evidence of foreign body.    Assessment:  1.  Closed displaced intra-articular distal phalanx " fracture of the great toe of the left foot.    Plan:  I have explained to Aundrea  about the conditions.  We discussed both conservative and surgical treatment options with all associated risks and benefits.  At this time, I do not believe there is need for surgery.  The patient will return to the office in one month for reevaluation and repeat x-rays.  The patient was given a refill for oxycodone 5 mg #10 for breakthrough pain.    Disclaimer: This note consists of symbols derived from keyboarding, dictation and/or voice recognition software. As a result, there may be errors in the script that have gone undetected. Please consider this when interpreting information found in this chart.       XENIA Fuller D.P.M., F.[email or web address].S.

## 2019-08-14 NOTE — LETTER
8/14/2019         RE: Aundrea Swenson  [id number] [facility] [id number]        Dear Colleague,    Thank you for referring your patient, Aundrea Swenson, to the [facility]. Please see a copy of my visit note below.    PATIENT HISTORY:  Aundrea Swenson is a 49 year old female who presents to clinic with a chief complaint of a painful left foot.  The patient relates the pain is located on the distal aspect of the great toe on the left foot.  The patient relates injuring the foot on August 9 .  The patient was seen by the ER with x-rays revealing comminuted fracture of the distal phalanx of the left great toe.  The patient was splinted and instructed on protected weight bearing with crutches.    REVIEW OF SYSTEMS:  Constitutional, HEENT, cardiovascular, pulmonary, GI, , musculoskeletal, neuro, skin, endocrine and psych systems are negative, except as otherwise noted.     PAST MEDICAL HISTORY:   Past Medical History:   Diagnosis Date     CAD (coronary artery disease) ~2005    Found on angioplasty at [facility] during w/u for angina        PAST SURGICAL HISTORY: No past surgical history on file.     MEDICATIONS:   Current Outpatient Medications:      acetaminophen (TYLENOL) 500 MG tablet, Take 1,000 mg by mouth, Disp: , Rfl:      aspirin 81 MG tablet, Take 1 tablet by mouth daily., Disp: , Rfl:      diclofenac (VOLTAREN) 50 MG EC tablet, Take 1 tablet (50 mg) by mouth 3 times daily, Disp: 90 tablet, Rfl: 1     EPINEPHrine (EPIPEN) 0.3 MG/0.3ML injection, Inject 0.3 mls into the muscle once as needed., Disp: 2 each, Rfl: 1     lidocaine (LIDODERM) 5 % patch, Apply 1 patches to effected area and leave in place for 12hours . Than remove and leave off for 12 hours., Disp: 30 patch, Rfl: 3     naproxen (NAPROSYN) 500 MG tablet, Take 1 tablet (500 mg) by mouth every 12 hours, Disp: 100 tablet, Rfl: 0     nicotine (NICODERM CQ) 7 MG/24HR patch 2h hr, Place 1 patch onto the skin every 24 hours, Disp: 30  patch, Rfl: 1     nitroglycerin (NITROSTAT) 0.4 MG SL tablet, Place 1 tablet (0.4 mg) under the tongue every 5 minutes as needed Up to 3 dose, call 911 if pain persists, Disp: 25 tablet, Rfl: 0     oxyCODONE (ROXICODONE) 5 MG tablet, Take 1 tablet (5 mg) by mouth every 6 hours as needed for pain, Disp: 10 tablet, Rfl: 0     cyclobenzaprine (FLEXERIL) 10 MG tablet, Take 1 tablet (10 mg) by mouth 3 times daily as needed (Patient not taking: Reported on 8/14/2019), Disp: 20 tablet, Rfl: 1     ALLERGIES:    Allergies   Allergen Reactions     Bee Venom Shortness Of Breath and Rash     Atorvastatin Muscle Pain (Myalgia)     Cramps in calves     Chantix [Varenicline]      Became aggressive     Codeine Swelling and Rash     Penicillins Swelling and Rash     [place] Swelling and Rash        SOCIAL HISTORY:   Social History     Socioeconomic History     Marital status:      Spouse name: Not on file     Number of children: Not on file     Years of education: Not on file     Highest education level: Not on file   Occupational History     Not on file   Social Needs     Financial resource strain: Not on file     Food insecurity:     Worry: Not on file     Inability: Not on file     Transportation needs:     Medical: Not on file     Non-medical: Not on file   Tobacco Use     Smoking status: Current Every Day Smoker     Smokeless tobacco: Never Used     Tobacco comment: pt in process of quitting per pt.   Substance and Sexual Activity     Alcohol use: Not on file     Drug use: Not on file     Sexual activity: Not on file   Lifestyle     Physical activity:     Days per week: Not on file     Minutes per session: Not on file     Stress: Not on file   Relationships     Social connections:     Talks on phone: Not on file     Gets together: Not on file     Attends Yarsani service: Not on file     Active member of club or organization: Not on file     Attends meetings of clubs or organizations: Not on file     Relationship  "status: Not on file     Intimate partner violence:     Fear of current or ex partner: Not on file     Emotionally abused: Not on file     Physically abused: Not on file     Forced sexual activity: Not on file   Other Topics Concern     Not on file   Social History Narrative     Not on file        FAMILY HISTORY: No family history on file.     EXAM:Vitals: /66   Pulse 75   Ht 1.664 m (5' 5.5\")   Wt 86.2 kg (190 lb)   BMI 31.14 kg/m     BMI= Body mass index is 31.14 kg/m .  Weight management plan: Patient was referred to their PCP to discuss a diet and exercise plan.    General appearance: Patient is alert and fully cooperative with history & exam.  No sign of distress is noted during the visit.     Psychiatric: Affect is pleasant & appropriate.  Patient appears motivated to improve health.     Respiratory: Breathing is regular & unlabored while sitting.     HEENT: Hearing is intact to spoken word.  Speech is clear.  No gross evidence of visual impairment that would impact ambulation.     Dermatologic: Skin is intact to both lower extremities without significant lesions, rash or abrasion.  No paronychia or evidence of soft tissue infection is noted.     Vascular: DP & PT pulses are intact & regular bilaterally.  No significant edema or varicosities noted.  CFT and skin temperature is normal to both lower extremities.     Neurologic: Lower extremity sensation is intact to light touch.  No evidence of weakness or contracture in the lower extremities.  No evidence of neuropathy.     Musculoskeletal: Patient is non-ambulatory with crutches.  No gross ankle deformity noted.  No foot or ankle joint effusion is noted.    One notes positive edema, positive ecchymosis.  One notes pain with palpation over the dorsal aspect overlying the great toe on the left.    Radiograph review of previous films including non weightbearing AP, lateral and medial oblique views of the left foot reveals an apparent comminuted " intra-articular fracture of the distal phalanx of the great toe.  All joint margins appear stable.  There is no apparent tumor formation noted.  There is no evidence of foreign body.    Assessment:  1.  Closed displaced intra-articular distal phalanx fracture of the great toe of the left foot.    Plan:  I have explained to Aundrea  about the conditions.  We discussed both conservative and surgical treatment options with all associated risks and benefits.  At this time, I do not believe there is need for surgery.  The patient will return to the office in one month for reevaluation and repeat x-rays.  The patient was given a refill for oxycodone 5 mg #10 for breakthrough pain.    Disclaimer: This note consists of symbols derived from keyboarding, dictation and/or voice recognition software. As a result, there may be errors in the script that have gone undetected. Please consider this when interpreting information found in this chart.       XENIA Fuller D.P.M., F.A.C.F.A.S.      Again, thank you for allowing me to participate in the care of your patient.        Sincerely,        Clay Fuller DPM

## 2019-09-18 ENCOUNTER — ANCILLARY PROCEDURE (OUTPATIENT)
Dept: GENERAL RADIOLOGY | Facility: CLINIC | Age: 49
End: 2019-09-18
Attending: PODIATRIST
Payer: COMMERCIAL

## 2019-09-18 ENCOUNTER — OFFICE VISIT (OUTPATIENT)
Dept: PODIATRY | Facility: CLINIC | Age: 49
End: 2019-09-18
Payer: COMMERCIAL

## 2019-09-18 VITALS
BODY MASS INDEX: 30.53 KG/M2 | RESPIRATION RATE: 16 BRPM | SYSTOLIC BLOOD PRESSURE: 117 MMHG | WEIGHT: 190 LBS | HEIGHT: 66 IN | DIASTOLIC BLOOD PRESSURE: 73 MMHG | HEART RATE: 69 BPM | OXYGEN SATURATION: 100 %

## 2019-09-18 DIAGNOSIS — S92.422D CLOSED DISPLACED FRACTURE OF DISTAL PHALANX OF LEFT GREAT TOE WITH ROUTINE HEALING, SUBSEQUENT ENCOUNTER: Primary | ICD-10-CM

## 2019-09-18 DIAGNOSIS — S92.422A CLOSED DISPLACED FRACTURE OF DISTAL PHALANX OF LEFT GREAT TOE, INITIAL ENCOUNTER: ICD-10-CM

## 2019-09-18 PROCEDURE — 73630 X-RAY EXAM OF FOOT: CPT | Mod: LT

## 2019-09-18 PROCEDURE — 99207 ZZC FRACTURE CARE IN GLOBAL PERIOD: CPT | Performed by: PODIATRIST

## 2019-09-18 RX ORDER — METHYLPREDNISOLONE 4 MG
4 TABLET, DOSE PACK ORAL SEE ADMIN INSTRUCTIONS
Qty: 21 TABLET | Refills: 0 | Status: SHIPPED | OUTPATIENT
Start: 2019-09-18 | End: 2019-10-17

## 2019-09-18 RX ORDER — MELOXICAM 7.5 MG/1
7.5 TABLET ORAL DAILY
Qty: 30 TABLET | Refills: 1 | Status: SHIPPED | OUTPATIENT
Start: 2019-09-18 | End: 2019-10-17

## 2019-09-18 RX ORDER — OXYCODONE AND ACETAMINOPHEN 5; 325 MG/1; MG/1
1 TABLET ORAL EVERY 6 HOURS PRN
Qty: 12 TABLET | Refills: 0 | Status: SHIPPED | OUTPATIENT
Start: 2019-09-18 | End: 2019-09-21

## 2019-09-18 ASSESSMENT — MIFFLIN-ST. JEOR: SCORE: 1495.64

## 2019-09-18 NOTE — PATIENT INSTRUCTIONS
Initial musculoskeletal treatment recommendation:    1.  Wear supportive foot wear (stiff soles) and/or arch supports (rigid not cushion).  2.  Stretch the calf muscles as instructed once an hour.  3.  Massage the soft tissues around the injured area in the morning to loosen the tissue with an over the counter product like Icy Hot with Lidocaine  4.  Ice the injured area in the evening; 20 min on/off.  5. Take antiinflammatory medication as directed, Medrol dospak then Mobic.    If no improvement in symptoms within four to six weeks, return to clinic for reevaluation.

## 2019-09-18 NOTE — NURSING NOTE
"Chief Complaint   Patient presents with     RECHECK     fracture of left great toe - x-ray today - stubbed foot and now hurts even more       Initial /73 (BP Location: Right arm, Patient Position: Chair, Cuff Size: Adult Regular)   Pulse 69   Resp 16   Ht 1.664 m (5' 5.5\")   Wt 86.2 kg (190 lb)   SpO2 100%   BMI 31.14 kg/m   Estimated body mass index is 31.14 kg/m  as calculated from the following:    Height as of this encounter: 1.664 m (5' 5.5\").    Weight as of this encounter: 86.2 kg (190 lb).  Medications and allergies reviewed.    Efrain KINSEY CMA (AAMA)    "

## 2019-09-18 NOTE — PROGRESS NOTES
Aundrea returns to the office for reevaluation of the left foot.  The patient relates following the instructions given at the last visit with noted more pain.  The patient relates overall less  improvement in pain and function of the left foot.  The patient relates jamming her left foot causing pain over the dorsal arch of the left foot.  Patient denies any bruising.    PAST MEDICAL HISTORY:   Past Medical History:   Diagnosis Date     CAD (coronary artery disease) ~2005    Found on angioplasty at [facility] during w/u for angina       BMI= Body mass index is 31.14 kg/m .    Weight management plan: Patient was referred to their PCP to discuss a diet and exercise plan.    Physical Exam:    General: The patient appears to have a pleasant mental affect.    Lower extremity physical exam:  Neurovascular status is intact with palpable pedal pulses and intact epicritic sensations.  Muscular exam is within normal limits to major muscle groups.  Integument is intact.      One notes decreased edema.  One notes pain on palpation of the Lisfranc joint of the left foot.  No ecchymosis noted.    Radiograph evaluation including weightbearing AP, lateral and medial oblique views of the left foot reveals interval healing with increased trabeculation of the distal phalanx fracture of the left great toe.  The Lisfranc joint appears intact with no fracture or displacement noted.    Assessment:      ICD-10-CM    1. Closed displaced fracture of distal phalanx of left great toe with routine healing, subsequent encounter S92.422D XR Foot Left G/E 3 Views     meloxicam (MOBIC) 7.5 MG tablet     methylPREDNISolone (MEDROL DOSEPAK) 4 MG tablet therapy pack     oxyCODONE-acetaminophen (PERCOCET) 5-325 MG tablet       Plan:  I have explained to Aundrea about the conditions.  At this time, the patient was instructed on icing, stretching, tissue massage and support.   To reduce the amount of current inflammation, the patient was prescribed a Medrol  Dosepak followed with Mobic 7.5 mg to be taken daily with food and instructed to stop taking if any stomach irritation or swelling in extremities are noted.  The patient was prescribed 12 Percocet 5/325 tablets for acute pain.  The patient will return in four weeks for reevaluation.      Disclaimer: This note consists of symbols derived from keyboarding, dictation and/or voice recognition software. As a result, there may be errors in the script that have gone undetected. Please consider this when interpreting information found in this chart.       XENIA Fuller D.P.M., FROHIT.JANE.F.A.S.

## 2019-09-18 NOTE — LETTER
9/18/2019         RE: Aundrea Swenson  [address] [address]        Dear Colleague,    Thank you for referring your patient, Aundrea Swenson, to the [facility]. Please see a copy of my visit note below.    Aundrea returns to the office for reevaluation of the left foot.  The patient relates following the instructions given at the last visit with noted more pain.  The patient relates overall less  improvement in pain and function of the left foot.  The patient relates jamming her left foot causing pain over the dorsal arch of the left foot.  Patient denies any bruising.    PAST MEDICAL HISTORY:   Past Medical History:   Diagnosis Date     CAD (coronary artery disease) ~2005    Found on angioplasty at [facility] during w/u for angina       BMI= Body mass index is 31.14 kg/m .    Weight management plan: Patient was referred to their PCP to discuss a diet and exercise plan.    Physical Exam:    General: The patient appears to have a pleasant mental affect.    Lower extremity physical exam:  Neurovascular status is intact with palpable pedal pulses and intact epicritic sensations.  Muscular exam is within normal limits to major muscle groups.  Integument is intact.      One notes decreased edema.  One notes pain on palpation of the Lisfranc joint of the left foot.  No ecchymosis noted.    Radiograph evaluation including weightbearing AP, lateral and medial oblique views of the left foot reveals interval healing with increased trabeculation of the distal phalanx fracture of the left great toe.  The Lisfranc joint appears intact with no fracture or displacement noted.    Assessment:      ICD-10-CM    1. Closed displaced fracture of distal phalanx of left great toe with routine healing, subsequent encounter S92.422D XR Foot Left G/E 3 Views     meloxicam (MOBIC) 7.5 MG tablet     methylPREDNISolone (MEDROL DOSEPAK) 4 MG tablet therapy pack     oxyCODONE-acetaminophen (PERCOCET) 5-325 MG tablet        Plan:  I have explained to Aundrea about the conditions.  At this time, the patient was instructed on icing, stretching, tissue massage and support.   To reduce the amount of current inflammation, the patient was prescribed a Medrol Dosepak followed with Mobic 7.5 mg to be taken daily with food and instructed to stop taking if any stomach irritation or swelling in extremities are noted.  The patient was prescribed 12 Percocet 5/325 tablets for acute pain.  The patient will return in four weeks for reevaluation.      Disclaimer: This note consists of symbols derived from keyboarding, dictation and/or voice recognition software. As a result, there may be errors in the script that have gone undetected. Please consider this when interpreting information found in this chart.       XENIA Fuller, AMELIE.P.DAMARIS., F.A.C.F.A.S.      Again, thank you for allowing me to participate in the care of your patient.        Sincerely,        Clay Fuller DPM

## 2019-10-17 ENCOUNTER — HOSPITAL ENCOUNTER (EMERGENCY)
Facility: CLINIC | Age: 49
Discharge: HOME | End: 2019-10-17
Attending: NURSE PRACTITIONER | Admitting: NURSE PRACTITIONER
Payer: COMMERCIAL

## 2019-10-17 ENCOUNTER — APPOINTMENT (OUTPATIENT)
Dept: GENERAL RADIOLOGY | Facility: CLINIC | Age: 49
End: 2019-10-17
Attending: NURSE PRACTITIONER
Payer: COMMERCIAL

## 2019-10-17 VITALS
RESPIRATION RATE: 14 BRPM | OXYGEN SATURATION: 100 % | TEMPERATURE: 98 F | DIASTOLIC BLOOD PRESSURE: 83 MMHG | SYSTOLIC BLOOD PRESSURE: 139 MMHG

## 2019-10-17 DIAGNOSIS — S93.402A LEFT ANKLE SPRAIN: ICD-10-CM

## 2019-10-17 DIAGNOSIS — S92.422A CLOSED DISPLACED FRACTURE OF DISTAL PHALANX OF LEFT GREAT TOE, INITIAL ENCOUNTER: ICD-10-CM

## 2019-10-17 DIAGNOSIS — S93.602A FOOT SPRAIN, LEFT, INITIAL ENCOUNTER: ICD-10-CM

## 2019-10-17 PROCEDURE — G0463 HOSPITAL OUTPT CLINIC VISIT: HCPCS | Performed by: NURSE PRACTITIONER

## 2019-10-17 PROCEDURE — 73630 X-RAY EXAM OF FOOT: CPT | Mod: LT

## 2019-10-17 PROCEDURE — 25000132 ZZH RX MED GY IP 250 OP 250 PS 637: Performed by: NURSE PRACTITIONER

## 2019-10-17 PROCEDURE — 73610 X-RAY EXAM OF ANKLE: CPT | Mod: LT

## 2019-10-17 PROCEDURE — 99214 OFFICE O/P EST MOD 30 MIN: CPT | Mod: Z6 | Performed by: NURSE PRACTITIONER

## 2019-10-17 RX ORDER — IBUPROFEN 200 MG
600 TABLET ORAL ONCE
Status: COMPLETED | OUTPATIENT
Start: 2019-10-17 | End: 2019-10-17

## 2019-10-17 RX ORDER — OXYCODONE HYDROCHLORIDE 5 MG/1
5 TABLET ORAL EVERY 6 HOURS PRN
Qty: 8 TABLET | Refills: 0 | Status: SHIPPED | OUTPATIENT
Start: 2019-10-17 | End: 2023-06-13

## 2019-10-17 RX ADMIN — IBUPROFEN 600 MG: 200 TABLET, FILM COATED ORAL at 19:56

## 2019-10-17 NOTE — ED AVS SNAPSHOT
[facility] Emergency Department  [address] [address]  Phone:  [phone]  Fax:  [phone]                                    Aundrea Swenson   MRN: [id number]    Department:  [facility] Emergency Department   Date of Visit:  10/17/2019           After Visit Summary Signature Page    I have received my discharge instructions, and my questions have been answered. I have discussed any challenges I see with this plan with the nurse or doctor.    ..........................................................................................................................................  Patient/Patient Representative Signature      ..........................................................................................................................................  Patient Representative Print Name and Relationship to Patient    ..................................................               ................................................  Date                                   Time    ..........................................................................................................................................  Reviewed by Signature/Title    ...................................................              ..............................................  Date                                               Time          22EPIC Rev 08/18

## 2019-10-18 NOTE — ED TRIAGE NOTES
Pt re-injured left foot taking off her sock today and dog running into her foot. Took two percocet prior to arrival.

## 2019-10-18 NOTE — DISCHARGE INSTRUCTIONS
Use ibuprofen 600 mg as needed by mouth up to 4 times daily for pain management.  You may also take Tylenol 1000 mg up to 4 times daily as needed for pain management.  When pain is more severe you may take oxycodone 5 mg every 6 hours as needed for pain management.  Follow-up with Dr. Bravo in 1 week for reevaluation.

## 2019-10-18 NOTE — ED PROVIDER NOTES
"  History     Chief Complaint   Patient presents with     Foot Pain     left foot     HPI  Aundrea Swenson is a 49 year old female with past medical history of closed displaced fracture of the distal phalanx of the left great toe first noted on August 9, 2019 who presents to urgent care with recurrent injury to the left foot and ankle earlier today.  Patient states that she had her left foot placed over her right knee and was removing a sock when her dog ran into her foot with subsequent any immediate pain of the outside of the foot and the ankle.  Patient describes the pain as sharp and constant over the top and side of the foot.  Patient reports that the pain extends to the lateral side of the ankle.  Patient rates her pain to 6 out of 10-10 out of 10.  Patient has taken 2 Percocet to treat her pain management and that has brought the pain level down from 10 down to 6.    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       CAD (coronary artery disease) 12/16/2013     Priority: Medium      - \"mild coronary artery disease\" found on angioplasty during chest pain work up at [facility] in 2010   - negative stress test 2010       Tobacco abuse 11/01/2013     Priority: Medium     Health Care Home 12/27/2012     Priority: Medium     Tier " 0  DX V65.8 REPLACED WITH 83182 [facility]ALTH CARE HOME (04/08/2013)       Prinzmetal angina (H) 12/27/2012     Priority: Medium        Past Medical History:    Past Medical History:   Diagnosis Date     CAD (coronary artery disease) ~2005       Past Surgical History:    No past surgical history on file.    Family History:    No family history on file.    Social History:  Marital Status:   [4]  Social History     Tobacco Use     Smoking status: Current Every Day Smoker     Smokeless tobacco: Never Used     Tobacco comment: pt in process of quitting per pt.   Substance Use Topics     Alcohol use: Not on file     Drug use: Not on file        Medications:    oxyCODONE (ROXICODONE) 5 MG tablet  acetaminophen (TYLENOL) 500 MG tablet  aspirin 81 MG tablet  cyclobenzaprine (FLEXERIL) 10 MG tablet  diclofenac (VOLTAREN) 50 MG EC tablet  EPINEPHrine (EPIPEN) 0.3 MG/0.3ML injection  lidocaine (LIDODERM) 5 % patch  naproxen (NAPROSYN) 500 MG tablet  nicotine (NICODERM CQ) 7 MG/24HR patch 2h hr  nitroglycerin (NITROSTAT) 0.4 MG SL tablet      Review of Systems  As mentioned above in the history present illness. All other systems were reviewed and are negative.    Physical Exam   BP: 139/83  Heart Rate: 82  Temp: 98  F (36.7  C)  Resp: 14  SpO2: 100 %      Physical Exam  Vitals signs and nursing note reviewed.   Constitutional:       General: She is not in acute distress.     Appearance: She is well-developed. She is not diaphoretic.   HENT:      Head: Normocephalic and atraumatic.      Right Ear: External ear normal.      Left Ear: External ear normal.   Eyes:      General:         Right eye: No discharge.         Left eye: No discharge.      Conjunctiva/sclera: Conjunctivae normal.   Cardiovascular:      Rate and Rhythm: Regular rhythm.      Heart sounds: Normal heart sounds. No murmur. No friction rub.   Pulmonary:      Effort: Pulmonary effort is normal. No respiratory distress.      Breath sounds: Normal breath sounds. No  stridor. No wheezing or rales.   Chest:      Chest wall: No tenderness.   Musculoskeletal:      Left ankle: She exhibits decreased range of motion (due to pain) and swelling (mild swelling of the dorsal side of the foot at the mid metatarsals 1-4 without crepitus, deformity,, step offs). She exhibits no ecchymosis, no deformity, no laceration and normal pulse. Tenderness. Lateral malleolus, AITFL, CF ligament and posterior TFL tenderness found.      Left foot: Decreased range of motion (due to pain). Normal capillary refill. Tenderness (generalized dorsal foot and lateral ankle and toe/phalanx pain), bony tenderness (over mid metatarsals 1-3) and swelling (mild swelling) present. No crepitus, deformity or laceration.   Skin:     General: Skin is warm and dry.      Coloration: Skin is not pale.      Findings: No erythema or rash.   Neurological:      Mental Status: She is alert.         ED Course        Procedures    Results for orders placed or performed during the hospital encounter of 10/17/19 (from the past 24 hour(s))   Foot XR, G/E 3 views, left    Narrative    LEFT FOOT THREE OR MORE VIEWS     10/17/2019 7:54 PM     HISTORY: Dog ran into foot/ankle.    COMPARISON: 9/18/2019 x-ray.      Impression    IMPRESSION: Healing fracture distal phalanx great toe. No change in  position or alignment. No new fractures.    XR Ankle Left G/E 3 Views    Narrative    LEFT ANKLE THREE OR MORE VIEWS     10/17/2019 8:02 PM     HISTORY: Dog ran into foot.    COMPARISON: X-ray from 6/18/2018.      Impression    IMPRESSION: No evidence of acute fracture or subluxation. Mortise is  intact.        Medications   ibuprofen (ADVIL/MOTRIN) tablet 600 mg (600 mg Oral Given 10/17/19 1956)       Assessments & Plan (with Medical Decision Making)  Aundrea Swenson is a 49 year old female with past medical history of closed displaced fracture of the distal phalanx of the left great toe first noted on August 9, 2019 who presents to urgent care with  "recurrent injury to the left foot and ankle earlier today.  Patient states that she had her left foot placed over her right knee and was removing a sock when her dog ran into her foot with subsequent any immediate pain of the outside of the foot and the ankle.    On examination patient is exhibiting tenderness of the ankle and the foot and palpation to any area of the foot elicits withdrawal type response and exclamation of a \"ouch\".  There is noted mild swelling on the mid metatarsals of 3, 4 and 5.there is no obvious deformities.  X-ray of the ankle and foot reveal no new fractures with a intact mortise.  Will treat as foot sprain and ankle sprain and placed patient in cam walker boot crutches provided as patient states she is not able to be weightbearing.  Discussed pain management with ibuprofen and Tylenol and as needed oxycodone.  Recommend follow-up appointment in 1 week with podiatry for reevaluation.  Patient discharged in stable condition.     I have reviewed the nursing notes.    I have reviewed the findings, diagnosis, plan and need for follow up with the patient.    New Prescriptions    No medications on file       Final diagnoses:   Foot sprain, left, initial encounter   Left ankle sprain       10/17/2019   [facility] EMERGENCY DEPARTMENT     Kemi Tanner, ZAINA CNP  10/17/19 2040    "

## 2020-07-24 ENCOUNTER — APPOINTMENT (OUTPATIENT)
Dept: GENERAL RADIOLOGY | Facility: CLINIC | Age: 50
End: 2020-07-24
Attending: PHYSICIAN ASSISTANT
Payer: COMMERCIAL

## 2020-07-24 ENCOUNTER — HOSPITAL ENCOUNTER (EMERGENCY)
Facility: CLINIC | Age: 50
Discharge: HOME | End: 2020-07-24
Attending: PHYSICIAN ASSISTANT | Admitting: PHYSICIAN ASSISTANT
Payer: COMMERCIAL

## 2020-07-24 VITALS
TEMPERATURE: 97.8 F | OXYGEN SATURATION: 98 % | RESPIRATION RATE: 18 BRPM | WEIGHT: 190 LBS | DIASTOLIC BLOOD PRESSURE: 76 MMHG | SYSTOLIC BLOOD PRESSURE: 141 MMHG | HEIGHT: 66 IN | BODY MASS INDEX: 30.53 KG/M2

## 2020-07-24 DIAGNOSIS — M25.561 RIGHT KNEE PAIN: ICD-10-CM

## 2020-07-24 PROCEDURE — 99284 EMERGENCY DEPT VISIT MOD MDM: CPT | Mod: Z6 | Performed by: PHYSICIAN ASSISTANT

## 2020-07-24 PROCEDURE — 73560 X-RAY EXAM OF KNEE 1 OR 2: CPT | Mod: RT

## 2020-07-24 PROCEDURE — 99283 EMERGENCY DEPT VISIT LOW MDM: CPT | Performed by: PHYSICIAN ASSISTANT

## 2020-07-24 RX ORDER — OXYCODONE HYDROCHLORIDE 5 MG/1
5 TABLET ORAL EVERY 6 HOURS PRN
Qty: 8 TABLET | Refills: 0 | Status: SHIPPED | OUTPATIENT
Start: 2020-07-24 | End: 2023-06-13

## 2020-07-24 ASSESSMENT — ENCOUNTER SYMPTOMS: CONSTITUTIONAL NEGATIVE: 1

## 2020-07-24 ASSESSMENT — MIFFLIN-ST. JEOR: SCORE: 1503.58

## 2020-07-24 NOTE — ED PROVIDER NOTES
"  History     Chief Complaint   Patient presents with     Knee Pain     right knee pain, injury 3 days ago     HPI  Aundrea Swenson is a 49 year old female who presents with complaints of right knee pain and swelling since she injured it 3 days ago.  She states she was picking up a heavy box and was twisting and turning at the same time when she felt a sudden \"tearing\" pain to her right medial knee.  The pain throughout her knee has persisted since that time.  She describes difficulty sleeping due to the pain.  Patient also complains of this knee occasionally giving out on her while she is walking and especially with climbing stairs.  Patient reports difficulties fully extending and flexing the knee due to the swelling.  Patient reports ACL reconstruction on the left side in the past.      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       CAD (coronary artery disease) 12/16/2013     Priority: Medium      - \"mild coronary artery disease\" found on angioplasty during chest pain work up at [facility] in 2010   - negative stress test 2010       Tobacco abuse 11/01/2013     Priority: Medium     Health Care Home 12/27/2012     Priority: Medium     Tier 0  DX V65.8 REPLACED WITH [id number] [facility] CARE HOME (04/08/2013)       Prinzmetal angina (H) " "12/27/2012     Priority: Medium        Past Medical History:    Past Medical History:   Diagnosis Date     CAD (coronary artery disease) ~2005       Past Surgical History:    No past surgical history on file.    Family History:    No family history on file.    Social History:  Marital Status:   [4]  Social History     Tobacco Use     Smoking status: Current Every Day Smoker     Smokeless tobacco: Never Used     Tobacco comment: pt in process of quitting per pt.   Substance Use Topics     Alcohol use: Not on file     Drug use: Not on file        Medications:    oxyCODONE (ROXICODONE) 5 MG tablet  acetaminophen (TYLENOL) 500 MG tablet  aspirin 81 MG tablet  cyclobenzaprine (FLEXERIL) 10 MG tablet  diclofenac (VOLTAREN) 50 MG EC tablet  EPINEPHrine (EPIPEN) 0.3 MG/0.3ML injection  lidocaine (LIDODERM) 5 % patch  naproxen (NAPROSYN) 500 MG tablet  nicotine (NICODERM CQ) 7 MG/24HR patch 2h hr  nitroglycerin (NITROSTAT) 0.4 MG SL tablet  oxyCODONE (ROXICODONE) 5 MG tablet          Review of Systems   Constitutional: Negative.    Musculoskeletal:        Right knee pain and swelling   Skin: Negative.    All other systems reviewed and are negative.      Physical Exam   BP: (!) 141/76  Heart Rate: 89  Temp: 97.8  F (36.6  C)  Resp: 18  Height: 167.6 cm (5' 6\")  Weight: 86.2 kg (190 lb)  SpO2: 98 %      Physical Exam  Constitutional:       General: She is not in acute distress.     Appearance: Normal appearance. She is not ill-appearing, toxic-appearing or diaphoretic.   HENT:      Head: Normocephalic and atraumatic.   Cardiovascular:      Pulses: Normal pulses.   Pulmonary:      Effort: Pulmonary effort is normal.   Musculoskeletal:      Right knee: She exhibits decreased range of motion and swelling. She exhibits no ecchymosis, no deformity and no erythema. Tenderness found. Medial joint line and MCL tenderness noted. No lateral joint line, no LCL and no patellar tendon tenderness noted.      Right upper leg: Normal. " "She exhibits no tenderness, no bony tenderness and no swelling.      Right lower leg: Normal. She exhibits no tenderness, no bony tenderness and no swelling. No edema.   Skin:     General: Skin is warm and dry.   Neurological:      General: No focal deficit present.      Mental Status: She is alert.      Sensory: Sensation is intact.      Motor: Motor function is intact.         ED Course        Procedures    Results for orders placed or performed during the hospital encounter of 07/24/20 (from the past 24 hour(s))   XR Knee Right 1/2 Views    Narrative    Examination:  XR KNEE RT 1 /2 VW    Date:  7/24/2020 6:36 PM     Clinical Information: Twisted knee, pain and swelling to medial knee     Additional Information: none    Comparison: none      Impression    Impression:  1.  Right knee negative for fracture, joint malalignment, or joint  effusion. Normal joint spacing. Normal periarticular soft tissues.    ANURAG RODRIGUEZ MD       Medications - No data to display    Assessments & Plan (with Medical Decision Making)     Pt is a 49 year old female who presents with complaints of right knee pain and swelling since she injured it 3 days ago.  She states she was picking up a heavy box and was twisting and turning at the same time when she felt a sudden \"tearing\" pain to her right medial knee.  The pain throughout her knee has persisted since that time.  She describes difficulty sleeping due to the pain.  Patient also complains of this knee occasionally giving out on her while she is walking and especially with climbing stairs.  Patient reports difficulties fully extending and flexing the knee due to the swelling.  Patient reports ACL reconstruction on the left side in the past.    Pt is afebrile on arrival.  Exam as above.  X-rays of right knee were negative for fracture, malalignment, or joint effusion.  Discussed results with patient.  Concern remains for possible ligamentous or meniscal injury.  Hinged knee brace " was provided for stability.  Encouraged symptomatic treatments at home.  Return precautions were reviewed.  Hand-outs were provided.    Patient was sent with oxycodone for severe pain and was instructed to follow-up with orthopedics for continued care and management or sooner if new or worsening symptoms.  She is to return to the ED for persistent and/or worsening symptoms.  Patient expressed understanding of the diagnosis and plan and was discharged home in good condition.    I have reviewed the nursing notes.    I have reviewed the findings, diagnosis, plan and need for follow up with the patient.    Discharge Medication List as of 7/24/2020  7:08 PM      START taking these medications    Details   !! oxyCODONE (ROXICODONE) 5 MG tablet Take 1 tablet (5 mg) by mouth every 6 hours as needed for pain, Disp-8 tablet,R-0, Local Print       !! - Potential duplicate medications found. Please discuss with provider.          Final diagnoses:   Right knee pain       7/24/2020   [facility] EMERGENCY DEPARTMENT      Disclaimer:  This note consists of symbols derived from keyboarding, dictation and/or voice recognition software.  As a result, there may be errors in the script that have gone undetected.  Please consider this when interpreting information found in this chart.     Lorena Carpenter PA-C  07/24/20 0367

## 2020-07-24 NOTE — ED AVS SNAPSHOT
[facility] Emergency Department  [address] [address]  Phone:  [phone]  Fax:  [phone]                                    Aundrea Swenson   MRN: [id number]    Department:  [facility] Emergency Department   Date of Visit:  7/24/2020           After Visit Summary Signature Page    I have received my discharge instructions, and my questions have been answered. I have discussed any challenges I see with this plan with the nurse or doctor.    ..........................................................................................................................................  Patient/Patient Representative Signature      ..........................................................................................................................................  Patient Representative Print Name and Relationship to Patient    ..................................................               ................................................  Date                                   Time    ..........................................................................................................................................  Reviewed by Signature/Title    ...................................................              ..............................................  Date                                               Time          22EPIC Rev 08/18

## 2020-08-19 ENCOUNTER — HOSPITAL ENCOUNTER (EMERGENCY)
Facility: CLINIC | Age: 50
Discharge: HOME | End: 2020-08-19
Attending: PHYSICIAN ASSISTANT | Admitting: PHYSICIAN ASSISTANT
Payer: COMMERCIAL

## 2020-08-19 ENCOUNTER — APPOINTMENT (OUTPATIENT)
Dept: GENERAL RADIOLOGY | Facility: CLINIC | Age: 50
End: 2020-08-19
Attending: PHYSICIAN ASSISTANT
Payer: COMMERCIAL

## 2020-08-19 VITALS
WEIGHT: 190 LBS | DIASTOLIC BLOOD PRESSURE: 83 MMHG | BODY MASS INDEX: 30.67 KG/M2 | SYSTOLIC BLOOD PRESSURE: 135 MMHG | TEMPERATURE: 96.2 F | RESPIRATION RATE: 16 BRPM | OXYGEN SATURATION: 100 % | HEART RATE: 85 BPM

## 2020-08-19 DIAGNOSIS — S67.31XA CRUSHING INJURY OF RIGHT WRIST, INITIAL ENCOUNTER: ICD-10-CM

## 2020-08-19 PROCEDURE — 99284 EMERGENCY DEPT VISIT MOD MDM: CPT | Mod: Z6 | Performed by: PHYSICIAN ASSISTANT

## 2020-08-19 PROCEDURE — 73110 X-RAY EXAM OF WRIST: CPT | Mod: RT

## 2020-08-19 PROCEDURE — 99284 EMERGENCY DEPT VISIT MOD MDM: CPT | Mod: 25 | Performed by: PHYSICIAN ASSISTANT

## 2020-08-19 PROCEDURE — 29125 APPL SHORT ARM SPLINT STATIC: CPT | Mod: RT | Performed by: PHYSICIAN ASSISTANT

## 2020-08-19 PROCEDURE — 99283 EMERGENCY DEPT VISIT LOW MDM: CPT | Performed by: PHYSICIAN ASSISTANT

## 2020-08-19 RX ORDER — OXYCODONE HYDROCHLORIDE 5 MG/1
5 TABLET ORAL EVERY 6 HOURS PRN
Qty: 8 TABLET | Refills: 0 | Status: SHIPPED | OUTPATIENT
Start: 2020-08-19 | End: 2023-07-27

## 2020-08-19 NOTE — ED AVS SNAPSHOT
[facility] Emergency Department  [address] [address]  Phone:  [phone]  Fax:  [phone]                                    Aundrea Swenson   MRN: [id number]    Department:  [facility] Emergency Department   Date of Visit:  8/19/2020           After Visit Summary Signature Page    I have received my discharge instructions, and my questions have been answered. I have discussed any challenges I see with this plan with the nurse or doctor.    ..........................................................................................................................................  Patient/Patient Representative Signature      ..........................................................................................................................................  Patient Representative Print Name and Relationship to Patient    ..................................................               ................................................  Date                                   Time    ..........................................................................................................................................  Reviewed by Signature/Title    ...................................................              ..............................................  Date                                               Time          22EPIC Rev 08/18

## 2020-08-19 NOTE — ED PROVIDER NOTES
"  History     Chief Complaint   Patient presents with     Wrist Pain     dropped 30 lb motor on right wrist, + deformity     HPI  Aundrea Swenson is a 50 year old female who presents with complaints of right wrist pain since an injury this morning.  Patient states she accidentally dropped a 30 pound motor onto her wrist while cleaning out the garage this morning.  Patient has had significant pain, swelling, and limited range of motion of this right wrist since that time.  Patient is right-hand dominant.      Allergies:  Allergies   Allergen Reactions     Bee Venom Shortness Of Breath and Rash     Atorvastatin Muscle Pain (Myalgia)     Cramps in calves     Chantix [Varenicline]      Became aggressive     Codeine Swelling and Rash     Penicillins Swelling and Rash     Spiritwood Swelling and Rash       Problem List:    Patient Active Problem List    Diagnosis Date Noted     Stroke (cerebrum) (H) 04/12/2019     Priority: Medium     Fall at home 05/21/2015     Priority: Medium     Alcohol abuse, episodic drinking behavior 12/16/2013     Priority: Medium     Intermittent alcohol consumption that has resulted in increased agitation, worsening of chronic chest pain and acute coronary syndrome rule out admission at [facility] and [place] (12/2013).       CAD (coronary artery disease) 12/16/2013     Priority: Medium      - \"mild coronary artery disease\" found on angioplasty during chest pain work up at [facility] in 2010   - negative stress test 2010       Tobacco abuse 11/01/2013     Priority: Medium     Health Care Home 12/27/2012     Priority: Medium     Tier 0  DX V65.8 REPLACED WITH 04369 HEALTH CARE HOME (04/08/2013)       Prinzmetal angina (H) 12/27/2012     Priority: Medium        Past Medical History:    Past Medical History:   Diagnosis Date     CAD (coronary artery disease) ~2005       Past Surgical History:    No past surgical history on file.    Family History:    No family history on file.    Social History:  Marital " Status:   [4]  Social History     Tobacco Use     Smoking status: Current Every Day Smoker     Smokeless tobacco: Never Used     Tobacco comment: pt in process of quitting per pt.   Substance Use Topics     Alcohol use: Not on file     Drug use: Not on file        Medications:    acetaminophen (TYLENOL) 500 MG tablet  aspirin 81 MG tablet  cyclobenzaprine (FLEXERIL) 10 MG tablet  diclofenac (VOLTAREN) 50 MG EC tablet  EPINEPHrine (EPIPEN) 0.3 MG/0.3ML injection  lidocaine (LIDODERM) 5 % patch  naproxen (NAPROSYN) 500 MG tablet  nicotine (NICODERM CQ) 7 MG/24HR patch 2h hr  nitroglycerin (NITROSTAT) 0.4 MG SL tablet  oxyCODONE (ROXICODONE) 5 MG tablet  oxyCODONE (ROXICODONE) 5 MG tablet          Review of Systems   Constitutional: Negative.    Musculoskeletal:        Right wrist pain and swelling   Skin: Negative.    All other systems reviewed and are negative.      Physical Exam   BP: 135/83  Pulse: 85  Temp: 96.2  F (35.7  C)  Resp: 16  Weight: 86.2 kg (190 lb)  SpO2: 100 %      Physical Exam  Constitutional:       General: She is not in acute distress.     Appearance: Normal appearance. She is not ill-appearing, toxic-appearing or diaphoretic.   HENT:      Head: Normocephalic and atraumatic.   Pulmonary:      Effort: Pulmonary effort is normal.   Musculoskeletal:      Right wrist: She exhibits decreased range of motion, tenderness, bony tenderness and swelling. She exhibits no effusion, no crepitus, no deformity and no laceration.      Comments: Tenderness and swelling overlying right wrist.  Decreased ROM due to pain.  No breaks in the skin.  No other bony tenderness of the hand or forearm.   Skin:     General: Skin is warm and dry.   Neurological:      Mental Status: She is alert.      Sensory: Sensation is intact.      Motor: Motor function is intact.         ED Course        Procedures      Results for orders placed or performed during the hospital encounter of 08/19/20 (from the past 24 hour(s))   XR  Wrist Right G/E 3 Views    Narrative    XR WRIST RT G/E 3 VW 8/19/2020 11:14 AM     HISTORY: right wrist pain and deformity after dropping 30 lb motor on  arm      Impression    IMPRESSION: Negative exam.    YUMIKO RUSS MD       Medications - No data to display    Assessments & Plan (with Medical Decision Making)     Pt is a 50 year old female who presents with complaints of right wrist pain since an injury this morning.  Patient states she accidentally dropped a 30 pound motor onto her wrist while cleaning out the garage this morning.  Patient has had significant pain, swelling, and limited range of motion of this right wrist since that time.  Patient is right-hand dominant.    Pt is afebrile on arrival.  Exam as above.  X-rays of right wrist were negative for fracture or acute pathology.  Discussed results with patient.  Patient was placed in an orthoglass thumb spica wrist splint both for comfort and to assist with immobilization given possibility of occult fracture.  Encouraged symptomatic treatments at home.  Return precautions were reviewed.  Hand-outs were provided.    Patient was sent with Oxycodone for breakthrough pain and was instructed to follow-up with PCP if no improvement in 3-5 days for continued care and management or sooner if new or worsening symptoms.  She is to return to the ED for persistent and/or worsening symptoms.  Patient expressed understanding of the diagnosis and plan and was discharged home in good condition.    I have reviewed the nursing notes.    I have reviewed the findings, diagnosis, plan and need for follow up with the patient.    New Prescriptions    No medications on file       Final diagnoses:   Crushing injury of right wrist, initial encounter       8/19/2020   [facility] EMERGENCY DEPARTMENT      Disclaimer:  This note consists of symbols derived from keyboarding, dictation and/or voice recognition software.  As a result, there may be errors in the script that have  gone undetected.  Please consider this when interpreting information found in this chart.     Lorena Carpenter PA-C  08/20/20 0831

## 2020-08-20 ASSESSMENT — ENCOUNTER SYMPTOMS: CONSTITUTIONAL NEGATIVE: 1

## 2020-09-10 NOTE — ED NOTES
Bed: ED02  Expected date:   Expected time:   Means of arrival:   Comments:  Ambulamce  
EMS here to take pt to the [facility], TPA and saline running. [facility] will call me back for report.  
Unable to obtain alcohol level due to patient receiving TPA and EMS being here to take patient to the [facility] will inform RN in report that this was not completed. Lab was unable to use green tube that was sent earlier due to it already being opened so it would affect the results. MD notified as well.  
Statement Selected

## 2021-05-27 ENCOUNTER — RECORDS - HEALTHEAST (OUTPATIENT)
Dept: ADMINISTRATIVE | Facility: CLINIC | Age: 51
End: 2021-05-27

## 2021-05-29 ENCOUNTER — RECORDS - HEALTHEAST (OUTPATIENT)
Dept: ADMINISTRATIVE | Facility: CLINIC | Age: 51
End: 2021-05-29

## 2021-05-30 ENCOUNTER — RECORDS - HEALTHEAST (OUTPATIENT)
Dept: ADMINISTRATIVE | Facility: CLINIC | Age: 51
End: 2021-05-30

## 2021-06-02 ENCOUNTER — RECORDS - HEALTHEAST (OUTPATIENT)
Dept: ADMINISTRATIVE | Facility: CLINIC | Age: 51
End: 2021-06-02

## 2023-04-20 NOTE — PLAN OF CARE
D/I/A:    Neuro: Q 30 min complete @ 1100. Q1hr until 0200 on 4/13. Neuros WNL-PERRLA 3-4mm.baseline sensation absent in LLE, on shin after ACL surgery per pt. 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 C/o headache. BG in high 60s at time of headache. Juice given with rise to to BG of low 80s.Headache resolved. MRI.   Resp: RA, no SOB/LONGORIA, Reports dry, noncongestive/nonproductive tab   CV: NSR in 70-80s. Bedside Echo. MAP goal over 60.  Gtts/lines: NS @ 75 until PO increased.. PIV, 18 G intact in RAC.    GI/:Passed swallow eval. Reg diet orderedBS+, BM+, soft/loose/brown x2. Urinating well-1 missed urination.   Skin: Intact, no s/s of bruising. Tattoos.   Other: Refusing stroke teaching with PLC, smoking cessation teaching. Pt discussed having stroke like s/s before and reviewing the packet before. Went over stroke basics (s/s of strokes, when to seek care); Able to get up to commode per MD with patient care order.        P: Continue to monitor. Contact Neurcrit/Stroke team with changes.Discharge to home in AM.    English

## 2023-06-02 ENCOUNTER — APPOINTMENT (OUTPATIENT)
Dept: GENERAL RADIOLOGY | Facility: CLINIC | Age: 53
End: 2023-06-02
Attending: PHYSICIAN ASSISTANT
Payer: COMMERCIAL

## 2023-06-02 ENCOUNTER — HOSPITAL ENCOUNTER (EMERGENCY)
Facility: CLINIC | Age: 53
Discharge: HOME | End: 2023-06-02
Attending: PHYSICIAN ASSISTANT | Admitting: PHYSICIAN ASSISTANT
Payer: COMMERCIAL

## 2023-06-02 VITALS
SYSTOLIC BLOOD PRESSURE: 120 MMHG | TEMPERATURE: 97.2 F | WEIGHT: 187 LBS | DIASTOLIC BLOOD PRESSURE: 87 MMHG | OXYGEN SATURATION: 97 % | BODY MASS INDEX: 30.18 KG/M2 | RESPIRATION RATE: 18 BRPM | HEART RATE: 99 BPM
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
## 2023-06-02 DIAGNOSIS — M25.561 RIGHT KNEE PAIN: ICD-10-CM

## 2023-06-02 DIAGNOSIS — S89.91XA KNEE INJURY, RIGHT, INITIAL ENCOUNTER: ICD-10-CM

## 2023-06-02 PROCEDURE — 250N000013 HC RX MED GY IP 250 OP 250 PS 637: Performed by: PHYSICIAN ASSISTANT

## 2023-06-02 PROCEDURE — G0463 HOSPITAL OUTPT CLINIC VISIT: HCPCS | Performed by: PHYSICIAN ASSISTANT

## 2023-06-02 PROCEDURE — 73502 X-RAY EXAM HIP UNI 2-3 VIEWS: CPT

## 2023-06-02 PROCEDURE — 250N000013 HC RX MED GY IP 250 OP 250 PS 637: Performed by: FAMILY MEDICINE

## 2023-06-02 PROCEDURE — 99213 OFFICE O/P EST LOW 20 MIN: CPT | Performed by: PHYSICIAN ASSISTANT

## 2023-06-02 PROCEDURE — 73562 X-RAY EXAM OF KNEE 3: CPT | Mod: RT

## 2023-06-02 RX ORDER — ACETAMINOPHEN 500 MG
1000 TABLET ORAL ONCE
Status: COMPLETED | OUTPATIENT
Start: 2023-06-02 | End: 2023-06-02

## 2023-06-02 RX ORDER — OXYCODONE HYDROCHLORIDE 5 MG/1
5 TABLET ORAL EVERY 6 HOURS PRN
Qty: 10 TABLET | Refills: 0 | Status: SHIPPED | OUTPATIENT
Start: 2023-06-02 | End: 2023-06-05

## 2023-06-02 RX ORDER — OXYCODONE HYDROCHLORIDE 5 MG/1
5 TABLET ORAL ONCE
Status: COMPLETED | OUTPATIENT
Start: 2023-06-02 | End: 2023-06-02

## 2023-06-02 RX ADMIN — ACETAMINOPHEN 1000 MG: 500 TABLET, FILM COATED ORAL at 15:58

## 2023-06-02 RX ADMIN — OXYCODONE HYDROCHLORIDE 5 MG: 5 TABLET ORAL at 16:40

## 2023-06-02 ASSESSMENT — ACTIVITIES OF DAILY LIVING (ADL): ADLS_ACUITY_SCORE: 35

## 2023-06-02 ASSESSMENT — ENCOUNTER SYMPTOMS
CONSTITUTIONAL NEGATIVE: 1
WOUND: 1

## 2023-06-02 NOTE — ED PROVIDER NOTES
"  History     Chief Complaint   Patient presents with     Knee Injury     HPI  Aundrea Swenson is a 52 year old female who presents with complaints of right knee injury just prior to arrival.  Patient states she accidentally stepped in a pothole/uneven crack in the gas station parking lot and fell, initially feeling and hearing a \"pop\" and \"explosion\" in her knee and subsequently landing directly on her right knee.  Patient has been unable to weight-bear since that time.  Patient has numbness and tingling to the lower leg.  She states her right hip is also sore.  Patient sustained an abrasion to the knee.  Right knee has been swelling.      Allergies:  Allergies   Allergen Reactions     Bee Venom Shortness Of Breath and Rash     Atorvastatin Muscle Pain (Myalgia)     Cramps in calves     Chantix [Varenicline]      Became aggressive     Codeine Swelling and Rash     Penicillins Swelling and Rash     Strawberry Extract Swelling and Rash       Problem List:    Patient Active Problem List    Diagnosis Date Noted     Stroke (cerebrum) (H) 04/12/2019     Priority: Medium     Fall at home 05/21/2015     Priority: Medium     Alcohol abuse, episodic drinking behavior 12/16/2013     Priority: Medium     Intermittent alcohol consumption that has resulted in increased agitation, worsening of chronic chest pain and acute coronary syndrome rule out admission at [facility] and [place] (12/2013).       CAD (coronary artery disease) 12/16/2013     Priority: Medium      - \"mild coronary artery disease\" found on angioplasty during chest pain work up at [facility] in 2010   - negative stress test 2010       Tobacco abuse 11/01/2013     Priority: Medium     Health Care Home 12/27/2012     Priority: Medium     Tier 0  DX V65.8 REPLACED WITH 12658 [facility]ALTH CARE HOME (04/08/2013)       Prinzmetal angina (H) 12/27/2012     Priority: Medium        Past Medical History:    Past Medical History:   Diagnosis Date     CAD (coronary artery disease) ~2005 "       Past Surgical History:    Past Surgical History:   Procedure Laterality Date     ANTERIOR CRUCIATE LIGAMENT REPAIR        SECTION      x5       Family History:    Family History   Problem Relation Age of Onset     Hypertension Mother      Cerebrovascular Disease Mother      Hypertension Sister      Fibromyalgia Mother      Fibromyalgia Sister      Neuropathy Sister      Diabetes Mother      Diabetes Sister      Thyroid Disease Mother      Thyroid Disease Sister      Scoliosis Sister      Breast Cancer Maternal Grandmother      Coronary Artery Disease Maternal Grandfather      Cerebrovascular Disease Maternal Grandfather        Social History:  Marital Status:   [4]  Social History     Tobacco Use     Smoking status: Every Day     Smokeless tobacco: Never     Tobacco comments:     pt in process of quitting per pt.        Medications:    oxyCODONE (ROXICODONE) 5 MG tablet  acetaminophen (TYLENOL) 500 MG tablet  aspirin 81 MG tablet  cyclobenzaprine (FLEXERIL) 10 MG tablet  diclofenac (VOLTAREN) 50 MG EC tablet  EPINEPHrine (EPIPEN) 0.3 MG/0.3ML injection  lidocaine (LIDODERM) 5 % patch  naproxen (NAPROSYN) 500 MG tablet  nicotine (NICODERM CQ) 7 MG/24HR patch 2h hr  nitroglycerin (NITROSTAT) 0.4 MG SL tablet  oxyCODONE (ROXICODONE) 5 MG tablet  oxyCODONE (ROXICODONE) 5 MG tablet  oxyCODONE (ROXICODONE) 5 MG tablet          Review of Systems   Constitutional: Negative.    Musculoskeletal:        Right knee and hip pain   Skin: Positive for wound.   All other systems reviewed and are negative.      Physical Exam   BP: 120/87  Pulse: 99  Temp: 97.2  F (36.2  C)  Resp: 18  Weight: 84.8 kg (187 lb)  SpO2: 97 %      Physical Exam  Constitutional:       General: She is in acute distress.      Appearance: Normal appearance. She is not ill-appearing, toxic-appearing or diaphoretic.   HENT:      Head: Normocephalic and atraumatic.   Eyes:      Conjunctiva/sclera: Conjunctivae normal.   Cardiovascular:       Pulses: Normal pulses.   Pulmonary:      Effort: Pulmonary effort is normal.   Musculoskeletal:      Cervical back: Neck supple.      Right hip: Normal. No deformity, bony tenderness or crepitus. Normal range of motion. Normal strength.      Right upper leg: Normal. No swelling or bony tenderness.      Right knee: Swelling and bony tenderness present. No effusion, erythema, ecchymosis or crepitus. Decreased range of motion. Tenderness present over the medial joint line and lateral joint line.      Right lower leg: Normal. No swelling or bony tenderness.      Right ankle: Normal. No swelling. No tenderness. Normal range of motion.      Comments: Abrasion overlying right patella.  Diffuse tenderness to even light touch of right knee.  No significant swelling.   Skin:     General: Skin is warm and dry.      Capillary Refill: Capillary refill takes less than 2 seconds.   Neurological:      General: No focal deficit present.      Mental Status: She is alert.      Sensory: Sensation is intact.      Motor: Motor function is intact.         ED Course                 Procedures    Results for orders placed or performed during the hospital encounter of 06/02/23 (from the past 24 hour(s))   Pelvis XR w/ unilateral hip right    Narrative    EXAM: XR PELVIS AND HIP RIGHT 1 VIEW  LOCATION: [facility]  DATE/TIME: 6/2/2023 5:03 PM CDT    INDICATION: Right hip pain after a fall.  COMPARISON: None.      Impression    IMPRESSION: Negative right hip and pelvis. No fracture or concerning bone lesion. Normal joint alignment and spacing. Mild-moderate degenerative disc and facet changes at L4-L5 and L5-S1. Unremarkable SI joints.   XR Knee Right 3 Views    Narrative    EXAM: XR KNEE RIGHT 3 VIEWS  LOCATION: [facility]  DATE/TIME: 6/2/2023 5:04 PM CDT    INDICATION: Right knee pain after a fall.  COMPARISON: None.      Impression    IMPRESSION: Normal joint spaces and alignment. No  "fracture or joint effusion. Mild prepatellar soft tissue swelling. Tiny 1 mm superficial calcification versus skin foreign body in the anterolateral knee.       Medications   acetaminophen (TYLENOL) tablet 1,000 mg (1,000 mg Oral $Given 6/2/23 1558)   oxyCODONE (ROXICODONE) tablet 5 mg (5 mg Oral $Given 6/2/23 1640)       Assessments & Plan (with Medical Decision Making)     Pt is a 52 year old female who presents with complaints of right knee injury just prior to arrival.  Patient states she accidentally stepped in a pothole/uneven crack in the gas station parking lot and fell, initially feeling and hearing a \"pop\" and \"explosion\" in her knee and subsequently landing directly on her right knee.  Patient has been unable to weight-bear since that time.  Patient has numbness and tingling to the lower leg.  She states her right hip is also sore.  Patient sustained an abrasion to the knee.  Right knee has been swelling.    Pt is afebrile on arrival.  Exam as above.  X-rays of right hip were negative for fracture.  X-rays of right knee were negative for fracture, malalignment or joint effusion.  Discussed results with patient.  Discussed concern for possible ligamentous injury especially given her description of a \"pop\" in the right knee after injuring her knee on uneven ground followed by significant pain and now swelling.  Patient was placed in a hinged knee brace and given crutches.  Orthopedic referral placed.  Encouraged symptomatic treatments at home.  Return precautions were reviewed.  Hand-outs were provided.    Patient was sent with Oxycodone and was instructed to follow-up with orthopedics for continued care and management.  She is to return to the ED for persistent and/or worsening symptoms.  Patient expressed understanding of the diagnosis and plan and was discharged home in good condition.    I have reviewed the nursing notes.    I have reviewed the findings, diagnosis, plan and need for follow up with the " patient.      New Prescriptions    OXYCODONE (ROXICODONE) 5 MG TABLET    Take 1 tablet (5 mg) by mouth every 6 hours as needed for pain       Final diagnoses:   Fall, initial encounter   Knee injury, right, initial encounter   Right knee pain       6/2/2023   [facility] EMERGENCY DEPT      Disclaimer:  This note consists of symbols derived from keyboarding, dictation and/or voice recognition software.  As a result, there may be errors in the script that have gone undetected.  Please consider this when interpreting information found in this chart.     Lorena Carpenter PA-C  06/02/23 1804

## 2023-06-02 NOTE — ED TRIAGE NOTES
Here after having fall from standing onto pavement, stepped into a pothole & having pain to right knee & right hip, unable to bear wt, new numbness to lower leg since fall otherwise CMS intact.     Triage Assessment     Row Name 06/02/23 5627       Triage Assessment (Adult)    Airway WDL WDL       Respiratory WDL    Respiratory WDL WDL       Skin Circulation/Temperature WDL    Skin Circulation/Temperature WDL WDL       Cardiac WDL    Cardiac WDL WDL       Peripheral/Neurovascular WDL    Peripheral Neurovascular WDL WDL       Cognitive/Neuro/Behavioral WDL    Cognitive/Neuro/Behavioral WDL WDL

## 2023-06-13 ENCOUNTER — OFFICE VISIT (OUTPATIENT)
Dept: ORTHOPEDICS | Facility: CLINIC | Age: 53
End: 2023-06-13
Attending: PHYSICIAN ASSISTANT
Payer: COMMERCIAL

## 2023-06-13 VITALS
WEIGHT: 187 LBS | HEIGHT: 66 IN | SYSTOLIC BLOOD PRESSURE: 141 MMHG | DIASTOLIC BLOOD PRESSURE: 94 MMHG | BODY MASS INDEX: 30.05 KG/M2

## 2023-06-13 DIAGNOSIS — S89.91XA KNEE INJURY, RIGHT, INITIAL ENCOUNTER: ICD-10-CM
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
## 2023-06-13 DIAGNOSIS — M25.561 ACUTE PAIN OF RIGHT KNEE: ICD-10-CM

## 2023-06-13 PROCEDURE — 99204 OFFICE O/P NEW MOD 45 MIN: CPT | Performed by: FAMILY MEDICINE

## 2023-06-13 RX ORDER — OXYCODONE AND ACETAMINOPHEN 5; 325 MG/1; MG/1
1 TABLET ORAL EVERY 6 HOURS PRN
Qty: 10 TABLET | Refills: 0 | Status: SHIPPED | OUTPATIENT
Start: 2023-06-13 | End: 2023-06-16

## 2023-06-13 NOTE — PROGRESS NOTES
Aundrea Swenson  :  1970  DOS: 2023  MRN: [id number]    Sports Medicine Clinic Visit    PCP: Lefty Hdz    Aundrea Swenson is a 52 year old female who is seen in consultation at the request of  Lorena Carpenter PA-C, as an ER referral presenting with right knee injury.    Injury: Patient describes injury as walking, stepped into pothole, twisted knee and then fell landing directly on anterior knee that occurred ~ 11 days ago (23) at [employer].  Pain located over right anterior lateral knee, lateral joint line, nonradiating.  Additional Features:  Positive: swelling, bruising and weakness.  Symptoms are better with Ibuprofen, Other medications: Percocet and Rest, compression brace.  Symptoms are worse with: lying in bed, going from sit to stand, walking, twisting, bending knee.  Other evaluation and/or treatments so far consists of: Ice, Tylenol, Ibuprofen, Other medications: Percocet, Rest and ER visit, crutches, compression brace.  Recent imaging completed: X-rays completed 23.  Prior History of related problems: history of similar right knee injury that occurred ~ 6 months ago.    Social History: currently employed as [employer]    Review of Systems  Musculoskeletal: as above  Remainder of review of systems is negative including constitutional, CV, pulmonary, GI, Skin and Neurologic except as noted in HPI or medical history.    Past Medical History:   Diagnosis Date     CAD (coronary artery disease) ~    Found on angioplasty at [facility] during w/u for angina     Past Surgical History:   Procedure Laterality Date     ANTERIOR CRUCIATE LIGAMENT REPAIR        SECTION      x5     Family History   Problem Relation Age of Onset     Hypertension Mother      Cerebrovascular Disease Mother      Hypertension Sister      Fibromyalgia Mother      Fibromyalgia Sister      Neuropathy Sister      Diabetes Mother      Diabetes Sister      Thyroid Disease Mother      Thyroid Disease Sister   "    Scoliosis Sister      Breast Cancer Maternal Grandmother      Coronary Artery Disease Maternal Grandfather      Cerebrovascular Disease Maternal Grandfather        Objective  Ht 1.676 m (5' 6\")   Wt 84.8 kg (187 lb)   BMI 30.18 kg/m        General: healthy, alert and in no distress      HEENT: no scleral icterus or conjunctival erythema     Skin: no suspicious lesions or rash. No jaundice.     CV: regular rhythm by palpation, 2+ distal pulses, no pedal edema      Resp: normal respiratory effort without conversational dyspnea     Psych: normal mood and affect      Gait: antalgic, appropriate coordination and balance     Neuro: normal light touch sensory exam of the extremities. Motor strength as noted below     Right Knee exam    ROM:        Flexion 90 degrees       Extension lacks 5 degrees       Range of motion limited by pain, stiffness, guarding    Inspection:        effusion noted trace       ecchymosis noted subtle anterior knee over lateral aspect of the patella       Small healing abrasion over anterior knee/patella    Skin:       no visible deformities       well perfused       capillary refill brisk    Patellar Motion:        Normal patellar tracking noted through range of motion       Crepitus noted in the patellofemoral joint    Tender:        medial patellar border       lateral patellar border       medial joint line       lateral joint line       infrapatellar tendon       vevry tender over anterior knee, site of impact    Non Tender:         remainder of knee area    Special Tests:        Painful, limited Nithya       neg (-) Lachman       neg (-) anterior drawer       neg (-) posterior drawer       neg (-) varus at 0 deg and 30 deg       positive (+) valgus at 30 deg for mild pain, no clear laxity       Mild/moderate pain with forced extension    Evaluation of ipsilateral kinetic chain       normal strength with hip extension and abduction      Radiology  Results for orders placed or " performed during the hospital encounter of 06/02/23   XR Knee Right 3 Views    Narrative    EXAM: XR KNEE RIGHT 3 VIEWS  LOCATION: [facility]  DATE/TIME: 6/2/2023 5:04 PM CDT    INDICATION: Right knee pain after a fall.  COMPARISON: None.      Impression    IMPRESSION: Normal joint spaces and alignment. No fracture or joint effusion. Mild prepatellar soft tissue swelling. Tiny 1 mm superficial calcification versus skin foreign body in the anterolateral knee.   Pelvis XR w/ unilateral hip right    Narrative    EXAM: XR PELVIS AND HIP RIGHT 1 VIEW  LOCATION: [facility]  DATE/TIME: 6/2/2023 5:03 PM CDT    INDICATION: Right hip pain after a fall.  COMPARISON: None.      Impression    IMPRESSION: Negative right hip and pelvis. No fracture or concerning bone lesion. Normal joint alignment and spacing. Mild-moderate degenerative disc and facet changes at L4-L5 and L5-S1. Unremarkable SI joints.       Assessment:  1. Fall, initial encounter    2. Knee injury, right, initial encounter    3. Right knee pain        Plan:  Discussed the assessment with the patient.  Follow up: 2 weeks for close clinical follow up  Awkward fall with twisting and impact injury  XR images independently visualized and reviewed with patient today in clinic  No clear fracture on XR, still significant pain but improving  Ok for WBAT, assistive options and strategies reviewed  Compression and bracing options reviewed  No clear laxity with ligamentous testing today, no other obvious signs of internal derangement  Consider MRI based on short term clinical progress  Using less breakthrough pain medication over time, refill provided today, suspect will not need any additional refills, no abuse concerns  RICE reviewed  We discussed modified progressive pain-free activity as tolerated  PT will be considered based on short term progress, basic HEP for now reviewed focusing on maintaining quad activation  and increasing passive ROM as tolerated  We discussed modified progressive pain-free activity as tolerated  Home handouts provided and supportive care reviewed  All questions were answered today  Contact us with additional questions or concerns  Signs and sx of concern reviewed      Oswaldo Maria DO, CHE  Sports Medicine Physician  [facility]                Disclaimer: This note consists of symbols derived from keyboarding, dictation and/or voice recognition software. As a result, there may be errors in the script that have gone undetected. Please consider this when interpreting information found in this chart.

## 2023-07-27 ENCOUNTER — OFFICE VISIT (OUTPATIENT)
Dept: ORTHOPEDICS | Facility: CLINIC | Age: 53
End: 2023-07-27
Payer: COMMERCIAL

## 2023-07-27 ENCOUNTER — ANCILLARY PROCEDURE (OUTPATIENT)
Dept: GENERAL RADIOLOGY | Facility: CLINIC | Age: 53
End: 2023-07-27
Attending: FAMILY MEDICINE
Payer: COMMERCIAL

## 2023-07-27 VITALS
WEIGHT: 182 LBS | HEIGHT: 66 IN | DIASTOLIC BLOOD PRESSURE: 91 MMHG | SYSTOLIC BLOOD PRESSURE: 136 MMHG | BODY MASS INDEX: 29.25 KG/M2

## 2023-07-27 DIAGNOSIS — S69.92XA INJURY OF LEFT WRIST, INITIAL ENCOUNTER: Primary | ICD-10-CM

## 2023-07-27 DIAGNOSIS — S89.91XD RIGHT KNEE INJURY, SUBSEQUENT ENCOUNTER: ICD-10-CM

## 2023-07-27 DIAGNOSIS — S69.92XA INJURY OF LEFT WRIST, INITIAL ENCOUNTER: ICD-10-CM

## 2023-07-27 DIAGNOSIS — M25.561 ACUTE PAIN OF RIGHT KNEE: ICD-10-CM
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
## 2023-07-27 PROCEDURE — 73110 X-RAY EXAM OF WRIST: CPT | Mod: TC | Performed by: RADIOLOGY

## 2023-07-27 PROCEDURE — 99214 OFFICE O/P EST MOD 30 MIN: CPT | Performed by: FAMILY MEDICINE

## 2023-07-27 RX ORDER — OXYCODONE HYDROCHLORIDE 5 MG/1
5 TABLET ORAL EVERY 6 HOURS PRN
Qty: 10 TABLET | Refills: 0 | Status: SHIPPED | OUTPATIENT
Start: 2023-07-27 | End: 2023-09-21

## 2023-07-27 NOTE — LETTER
2023         RE: Aundrea Swenson  [address] [address] [id number]        Dear Colleague,    Thank you for referring your patient, Aundrea Swenson, to the [facility] [place]. Please see a copy of my visit note below.    Aundrea Swenson  :  1970  DOS: 23  MRN: [id number]    Sports Medicine Clinic Visit    PCP: Lefty Hdz    Aundrea Swenson is a 52 year old female who is seen in consultation at the request of  Lorena Carpenter PA-C, as an ER referral presenting with right knee injury.    Injury: Patient describes injury as walking, stepped into pothole, twisted knee and then fell landing directly on anterior knee that occurred ~ 11 days ago (23) at [place].  Pain located over right anterior lateral knee, lateral joint line, nonradiating.  Additional Features:  Positive: swelling, bruising and weakness.  Symptoms are better with Ibuprofen, Other medications: Percocet and Rest, compression brace.  Symptoms are worse with: lying in bed, going from sit to stand, walking, twisting, bending knee.  Other evaluation and/or treatments so far consists of: Ice, Tylenol, Ibuprofen, Other medications: Percocet, Rest and ER visit, crutches, compression brace.  Recent imaging completed: X-rays completed 23.  Prior History of related problems: history of similar right knee injury that occurred ~ 6 months ago.    Social History: currently employed as [employer]    Interim History - 2023  Since last visit on 2023 patient has minimal right knee pain over the past.  She does note periodic instability (1 - 2x/week), that did cause her to fall.  No interim injury.       Patient is also requesting to be seen to left wrist injury, ulnar sided pain that occurred ~ 2 weeks ago after falling and landing on left arm, due to her knee giving out.  Pain is worse with supination/pronation, moving wrist, and grasping objects.  Pain is better with wrist compression  "brace, no other formal treatment completed.  No prior imaging.  History of remote scaphoid injury > 10 years ago.      Review of Systems  Musculoskeletal: as above  Remainder of review of systems is negative including constitutional, CV, pulmonary, GI, Skin and Neurologic except as noted in HPI or medical history.    Past Medical History:   Diagnosis Date     CAD (coronary artery disease) ~    Found on angioplasty at [facility] during w/u for angina     Past Surgical History:   Procedure Laterality Date     ANTERIOR CRUCIATE LIGAMENT REPAIR        SECTION      x5     Family History   Problem Relation Age of Onset     Hypertension Mother      Cerebrovascular Disease Mother      Hypertension Sister      Fibromyalgia Mother      Fibromyalgia Sister      Neuropathy Sister      Diabetes Mother      Diabetes Sister      Thyroid Disease Mother      Thyroid Disease Sister      Scoliosis Sister      Breast Cancer Maternal Grandmother      Coronary Artery Disease Maternal Grandfather      Cerebrovascular Disease Maternal Grandfather        Objective  BP (!) 136/91   Ht 1.676 m (5' 6\")   Wt 82.6 kg (182 lb)   BMI 29.38 kg/m      General: healthy, alert and in no distress    HEENT: no scleral icterus or conjunctival erythema   Skin: no suspicious lesions or rash. No jaundice.   CV: regular rhythm by palpation, 2+ distal pulses, no pedal edema    Resp: normal respiratory effort without conversational dyspnea   Psych: normal mood and affect    Gait: antalgic, appropriate coordination and balance   Neuro: normal light touch sensory exam of the extremities. Motor strength as noted below     Right Knee exam    ROM:        Flexion 130 degrees       Extension 0 degrees       Range of motion limited by pain, stiffness, guarding    Inspection:        effusion noted trace       ecchymosis resolved over anterior knee over lateral aspect of the patella    Skin:       no visible deformities       well perfused       capillary " refill brisk    Patellar Motion:        Normal patellar tracking noted through range of motion       Crepitus noted in the patellofemoral joint    Tender:        medial patellar border minimal       lateral patellar border resolved       medial joint line minimal       lateral joint line resolved       infrapatellar tendon minimal       Significantly less tender over anterior knee, site of impact    Non Tender:         remainder of knee area    Evaluation of ipsilateral kinetic chain       normal strength with hip extension and abduction    Left wrist exam  Mild swelling generalized over the distal forearm and wrist, no bruising appreciated  Mild to moderate tenderness palpation of the distal ulna, focally tender over the ulnar styloid and ECU tendon  Milder generalized tenderness to palpation about the dorsal wrist  Decreased terminal range of motion, hesitant to perform active extension and supination  Motor function intact    Radiology  Results for orders placed or performed during the hospital encounter of 06/02/23   XR Knee Right 3 Views    Narrative    EXAM: XR KNEE RIGHT 3 VIEWS  LOCATION: [facility]  DATE/TIME: 6/2/2023 5:04 PM CDT    INDICATION: Right knee pain after a fall.  COMPARISON: None.      Impression    IMPRESSION: Normal joint spaces and alignment. No fracture or joint effusion. Mild prepatellar soft tissue swelling. Tiny 1 mm superficial calcification versus skin foreign body in the anterolateral knee.   Pelvis XR w/ unilateral hip right    Narrative    EXAM: XR PELVIS AND HIP RIGHT 1 VIEW  LOCATION: [facility]  DATE/TIME: 6/2/2023 5:03 PM CDT    INDICATION: Right hip pain after a fall.  COMPARISON: None.      Impression    IMPRESSION: Negative right hip and pelvis. No fracture or concerning bone lesion. Normal joint alignment and spacing. Mild-moderate degenerative disc and facet changes at L4-L5 and L5-S1. Unremarkable SI joints.      Recent Results (from the past 744 hour(s))   XR Wrist Left G/E 3 Views    Narrative    XR WRIST LEFT G/E 3 VIEWS 7/27/2023 2:07 PM     HISTORY: Pain after injury    COMPARISON: None.         Impression    IMPRESSION: The left wrist is negative for fracture. Normal carpal  alignment.    LASHA FARR MD         SYSTEM ID:  [id number]       Assessment:  1. Injury of left wrist, initial encounter          Plan:  Discussed the assessment with the patient.  Follow up: 3-4 weeks for ongoing close clinical follow up   Awkward fall with twisting and impact injury to the knee has significantly improved  XR images independently visualized and reviewed with patient again today in clinic  Continue WBAT, assistive options and strategies reviewed if/when needed  Compression and bracing options reviewed  No clear laxity with ligamentous testing, no other obvious signs of internal derangement, encouraging clinical progress  Consider MRI based on short term clinical progress, hopeful to avoid  Using less breakthrough pain medication over time, refill provided today given new acute left wrist injury, suspect will not need any additional refills, no abuse concerns  Cannot completely rule out subtle fracture to the ulnar styloid based on imaging and exam, also signs of acute strain of the ECU tendon  Wrist brace provided, use full-time as needed, then wean as tolerated  Consider additional imaging based on progress  Oral Tylenol and topical Voltaren gel reviewed as safe OTC options, reviewed safe dosing strategies  RICE reviewed  We discussed modified progressive pain-free activity as tolerated  Supportive care reviewed  All questions were answered today  Contact us with additional questions or concerns  Signs and sx of concern reviewed      Oswaldo Maria DO, CAQ  Sports Medicine Physician  [facility]      Time spent in chart review, one-on-one evaluation, discussion with patient regarding:  nature of problem, clinical course, prior treatments, therapeutic options, shared-decision making, potential procedures and referrals, and charting related to the visit: 31 minutes.  If applicable, time does not include time spent performing any procedure.            Disclaimer: This note consists of symbols derived from keyboarding, dictation and/or voice recognition software. As a result, there may be errors in the script that have gone undetected. Please consider this when interpreting information found in this chart.      Again, thank you for allowing me to participate in the care of your patient.        Sincerely,        Oswaldo Maria, DO

## 2023-07-27 NOTE — PROGRESS NOTES
Aundrea Swenson  :  1970  DOS: 23  MRN: [id number]    Sports Medicine Clinic Visit    PCP: Lefty Hdz    Aundrea Swenson is a 52 year old female who is seen in consultation at the request of  Lorena Carpenter PA-C, as an ER referral presenting with right knee injury.    Injury: Patient describes injury as walking, stepped into pothole, twisted knee and then fell landing directly on anterior knee that occurred ~ 11 days ago (23) at [employer].  Pain located over right anterior lateral knee, lateral joint line, nonradiating.  Additional Features:  Positive: swelling, bruising and weakness.  Symptoms are better with Ibuprofen, Other medications: Percocet and Rest, compression brace.  Symptoms are worse with: lying in bed, going from sit to stand, walking, twisting, bending knee.  Other evaluation and/or treatments so far consists of: Ice, Tylenol, Ibuprofen, Other medications: Percocet, Rest and ER visit, crutches, compression brace.  Recent imaging completed: X-rays completed 23.  Prior History of related problems: history of similar right knee injury that occurred ~ 6 months ago.    Social History: currently employed as [employer]    Interim History - 2023  Since last visit on 2023 patient has minimal right knee pain over the past.  She does note periodic instability (1 - 2x/week), that did cause her to fall.  No interim injury.       Patient is also requesting to be seen to left wrist injury, ulnar sided pain that occurred ~ 2 weeks ago after falling and landing on left arm, due to her knee giving out.  Pain is worse with supination/pronation, moving wrist, and grasping objects.  Pain is better with wrist compression brace, no other formal treatment completed.  No prior imaging.  History of remote scaphoid injury > 10 years ago.      Review of Systems  Musculoskeletal: as above  Remainder of review of systems is negative including constitutional, CV, pulmonary, GI, Skin and  "Neurologic except as noted in HPI or medical history.    Past Medical History:   Diagnosis Date    CAD (coronary artery disease) ~    Found on angioplasty at [facility] during w/u for angina     Past Surgical History:   Procedure Laterality Date    ANTERIOR CRUCIATE LIGAMENT REPAIR       SECTION      x5     Family History   Problem Relation Age of Onset    Hypertension Mother     Cerebrovascular Disease Mother     Hypertension Sister     Fibromyalgia Mother     Fibromyalgia Sister     Neuropathy Sister     Diabetes Mother     Diabetes Sister     Thyroid Disease Mother     Thyroid Disease Sister     Scoliosis Sister     Breast Cancer Maternal Grandmother     Coronary Artery Disease Maternal Grandfather     Cerebrovascular Disease Maternal Grandfather        Objective  BP (!) 136/91   Ht 1.676 m (5' 6\")   Wt 82.6 kg (182 lb)   BMI 29.38 kg/m      General: healthy, alert and in no distress    HEENT: no scleral icterus or conjunctival erythema   Skin: no suspicious lesions or rash. No jaundice.   CV: regular rhythm by palpation, 2+ distal pulses, no pedal edema    Resp: normal respiratory effort without conversational dyspnea   Psych: normal mood and affect    Gait: antalgic, appropriate coordination and balance   Neuro: normal light touch sensory exam of the extremities. Motor strength as noted below     Right Knee exam    ROM:        Flexion 130 degrees       Extension 0 degrees       Range of motion limited by pain, stiffness, guarding    Inspection:        effusion noted trace       ecchymosis resolved over anterior knee over lateral aspect of the patella    Skin:       no visible deformities       well perfused       capillary refill brisk    Patellar Motion:        Normal patellar tracking noted through range of motion       Crepitus noted in the patellofemoral joint    Tender:        medial patellar border minimal       lateral patellar border resolved       medial joint line minimal       lateral " joint line resolved       infrapatellar tendon minimal       Significantly less tender over anterior knee, site of impact    Non Tender:         remainder of knee area    Evaluation of ipsilateral kinetic chain       normal strength with hip extension and abduction    Left wrist exam  Mild swelling generalized over the distal forearm and wrist, no bruising appreciated  Mild to moderate tenderness palpation of the distal ulna, focally tender over the ulnar styloid and ECU tendon  Milder generalized tenderness to palpation about the dorsal wrist  Decreased terminal range of motion, hesitant to perform active extension and supination  Motor function intact    Radiology  Results for orders placed or performed during the hospital encounter of 06/02/23   XR Knee Right 3 Views    Narrative    EXAM: XR KNEE RIGHT 3 VIEWS  LOCATION: [facility]  DATE/TIME: 6/2/2023 5:04 PM CDT    INDICATION: Right knee pain after a fall.  COMPARISON: None.      Impression    IMPRESSION: Normal joint spaces and alignment. No fracture or joint effusion. Mild prepatellar soft tissue swelling. Tiny 1 mm superficial calcification versus skin foreign body in the anterolateral knee.   Pelvis XR w/ unilateral hip right    Narrative    EXAM: XR PELVIS AND HIP RIGHT 1 VIEW  LOCATION: [facility]  DATE/TIME: 6/2/2023 5:03 PM CDT    INDICATION: Right hip pain after a fall.  COMPARISON: None.      Impression    IMPRESSION: Negative right hip and pelvis. No fracture or concerning bone lesion. Normal joint alignment and spacing. Mild-moderate degenerative disc and facet changes at L4-L5 and L5-S1. Unremarkable SI joints.     Recent Results (from the past 744 hour(s))   XR Wrist Left G/E 3 Views    Narrative    XR WRIST LEFT G/E 3 VIEWS 7/27/2023 2:07 PM     HISTORY: Pain after injury    COMPARISON: None.         Impression    IMPRESSION: The left wrist is negative for fracture. Normal  carpal  alignment.    LASHA FARR MD         SYSTEM ID:  [id number]       Assessment:  1. Injury of left wrist, initial encounter          Plan:  Discussed the assessment with the patient.  Follow up: 3-4 weeks for ongoing close clinical follow up   Awkward fall with twisting and impact injury to the knee has significantly improved  XR images independently visualized and reviewed with patient again today in clinic  Continue WBAT, assistive options and strategies reviewed if/when needed  Compression and bracing options reviewed  No clear laxity with ligamentous testing, no other obvious signs of internal derangement, encouraging clinical progress  Consider MRI based on short term clinical progress, hopeful to avoid  Using less breakthrough pain medication over time, refill provided today given new acute left wrist injury, suspect will not need any additional refills, no abuse concerns  Cannot completely rule out subtle fracture to the ulnar styloid based on imaging and exam, also signs of acute strain of the ECU tendon  Wrist brace provided, use full-time as needed, then wean as tolerated  Consider additional imaging based on progress  Oral Tylenol and topical Voltaren gel reviewed as safe OTC options, reviewed safe dosing strategies  RICE reviewed  We discussed modified progressive pain-free activity as tolerated  Supportive care reviewed  All questions were answered today  Contact us with additional questions or concerns  Signs and sx of concern reviewed      Oswaldo Maria DO, CHE  Sports Medicine Physician  [facility]      Time spent in chart review, one-on-one evaluation, discussion with patient regarding: nature of problem, clinical course, prior treatments, therapeutic options, shared-decision making, potential procedures and referrals, and charting related to the visit: 31 minutes.  If applicable, time does not include time spent performing any  procedure.            Disclaimer: This note consists of symbols derived from keyboarding, dictation and/or voice recognition software. As a result, there may be errors in the script that have gone undetected. Please consider this when interpreting information found in this chart.

## 2023-09-21 ENCOUNTER — OFFICE VISIT (OUTPATIENT)
Dept: ORTHOPEDICS | Facility: CLINIC | Age: 53
End: 2023-09-21
Payer: COMMERCIAL

## 2023-09-21 VITALS
BODY MASS INDEX: 28.93 KG/M2 | WEIGHT: 180 LBS | HEIGHT: 66 IN | SYSTOLIC BLOOD PRESSURE: 145 MMHG | DIASTOLIC BLOOD PRESSURE: 88 MMHG

## 2023-09-21 DIAGNOSIS — S69.92XD INJURY OF LEFT WRIST, SUBSEQUENT ENCOUNTER: Primary | ICD-10-CM

## 2023-09-21 DIAGNOSIS — M65.4 DE QUERVAIN'S DISEASE (RADIAL STYLOID TENOSYNOVITIS): ICD-10-CM

## 2023-09-21 DIAGNOSIS — S69.92XA INJURY OF LEFT WRIST, INITIAL ENCOUNTER: ICD-10-CM

## 2023-09-21 DIAGNOSIS — M77.8 LEFT WRIST TENDINITIS: ICD-10-CM

## 2023-09-21 DIAGNOSIS — M25.432 WRIST SWELLING, LEFT: ICD-10-CM

## 2023-09-21 DIAGNOSIS — M25.532 ACUTE PAIN OF LEFT WRIST: ICD-10-CM

## 2023-09-21 PROCEDURE — 99214 OFFICE O/P EST MOD 30 MIN: CPT | Performed by: FAMILY MEDICINE

## 2023-09-21 RX ORDER — PREDNISONE 20 MG/1
40 TABLET ORAL DAILY
Qty: 10 TABLET | Refills: 0 | Status: SHIPPED | OUTPATIENT
Start: 2023-09-21

## 2023-09-21 RX ORDER — OXYCODONE HYDROCHLORIDE 5 MG/1
5 TABLET ORAL EVERY 6 HOURS PRN
Qty: 10 TABLET | Refills: 0 | Status: SHIPPED | OUTPATIENT
Start: 2023-09-21 | End: 2023-10-03

## 2023-09-21 NOTE — PROGRESS NOTES
Aundrea Swenson  :  1970  DOS: 23  MRN: [id number]    Sports Medicine Clinic Visit    PCP: Lefty Hdz    Aundrea Swenson is a 52 year old female who is seen in consultation at the request of  Lorena Carpenter PA-C, as an ER referral presenting with right knee injury.    Injury: Patient describes injury as walking, stepped into pothole, twisted knee and then fell landing directly on anterior knee that occurred ~ 11 days ago (23) at [employer].  Pain located over right anterior lateral knee, lateral joint line, nonradiating.  Additional Features:  Positive: swelling, bruising and weakness.  Symptoms are better with Ibuprofen, Other medications: Percocet and Rest, compression brace.  Symptoms are worse with: lying in bed, going from sit to stand, walking, twisting, bending knee.  Other evaluation and/or treatments so far consists of: Ice, Tylenol, Ibuprofen, Other medications: Percocet, Rest and ER visit, crutches, compression brace.  Recent imaging completed: X-rays completed 23.  Prior History of related problems: history of similar right knee injury that occurred ~ 6 months ago.    Social History: currently employed as [employer]    Interim History - 2023  Since last visit on 2023 patient has minimal right knee pain over the past.  She does note periodic instability (1 - 2x/week), that did cause her to fall.  No interim injury.       Patient is also requesting to be seen to left wrist injury, ulnar sided pain that occurred ~ 2 weeks ago after falling and landing on left arm, due to her knee giving out.  Pain is worse with supination/pronation, moving wrist, and grasping objects.  Pain is better with wrist compression brace, no other formal treatment completed.  No prior imaging.  History of remote scaphoid injury > 10 years ago.    Interim History - 2023  Since last visit on 2023 patient has waxing and waning left ulnar sided wrist and left thumb CMC joint  "that is worse over the past 2 weeks.  Patient notes that she is currently helping care for her mother status post total knee arthroplasty.  Noted pop sensation in radial wrist after grasping hard on transfer belt.  Wearing wrist with minimal relief.  Currently using pain medication - oxycodone as needed, used last tab this AM.  No interim injury.           Review of Systems  Musculoskeletal: as above  Remainder of review of systems is negative including constitutional, CV, pulmonary, GI, Skin and Neurologic except as noted in HPI or medical history.    Past Medical History:   Diagnosis Date    CAD (coronary artery disease) ~    Found on angioplasty at [facility] during w/u for angina     Past Surgical History:   Procedure Laterality Date    ANTERIOR CRUCIATE LIGAMENT REPAIR       SECTION      x5     Family History   Problem Relation Age of Onset    Hypertension Mother     Cerebrovascular Disease Mother     Hypertension Sister     Fibromyalgia Mother     Fibromyalgia Sister     Neuropathy Sister     Diabetes Mother     Diabetes Sister     Thyroid Disease Mother     Thyroid Disease Sister     Scoliosis Sister     Breast Cancer Maternal Grandmother     Coronary Artery Disease Maternal Grandfather     Cerebrovascular Disease Maternal Grandfather        Objective  BP (!) 145/88   Ht 1.676 m (5' 6\")   Wt 81.6 kg (180 lb)   BMI 29.05 kg/m      General: healthy, alert and in no distress    HEENT: no scleral icterus or conjunctival erythema   Skin: no suspicious lesions or rash. No jaundice.   CV: regular rhythm by palpation, 2+ distal pulses, no pedal edema    Resp: normal respiratory effort without conversational dyspnea   Psych: normal mood and affect    Gait: antalgic, appropriate coordination and balance   Neuro: normal light touch sensory exam of the extremities. Motor strength as noted below     Left wrist exam  Mild swelling generalized over the distal forearm and wrist, still no bruising " appreciated  Still focally tender over the ECU tendon  Milder generalized tenderness to palpation about the dorsal wrist  New focal TTP over 1st dorsal compartment, milder over 1st CMC joint  Decreased terminal range of motion, hesitant to perform active extension, flexion, ulnar deviation and supination  Motor function intact    Bedside US reveals active tenosynovitis ECU and 1st dorsal compartment, mild 1st CMC joint effusion, no RC joint effusion, no apparent fracture    Radiology  Results for orders placed or performed during the hospital encounter of 06/02/23   XR Knee Right 3 Views    Narrative    EXAM: XR KNEE RIGHT 3 VIEWS  LOCATION: [facility]  DATE/TIME: 6/2/2023 5:04 PM CDT    INDICATION: Right knee pain after a fall.  COMPARISON: None.      Impression    IMPRESSION: Normal joint spaces and alignment. No fracture or joint effusion. Mild prepatellar soft tissue swelling. Tiny 1 mm superficial calcification versus skin foreign body in the anterolateral knee.   Pelvis XR w/ unilateral hip right    Narrative    EXAM: XR PELVIS AND HIP RIGHT 1 VIEW  LOCATION: [facility]  DATE/TIME: 6/2/2023 5:03 PM CDT    INDICATION: Right hip pain after a fall.  COMPARISON: None.      Impression    IMPRESSION: Negative right hip and pelvis. No fracture or concerning bone lesion. Normal joint alignment and spacing. Mild-moderate degenerative disc and facet changes at L4-L5 and L5-S1. Unremarkable SI joints.     Recent Results (from the past 744 hour(s))   XR Wrist Left G/E 3 Views    Narrative    XR WRIST LEFT G/E 3 VIEWS 7/27/2023 2:07 PM     HISTORY: Pain after injury    COMPARISON: None.         Impression    IMPRESSION: The left wrist is negative for fracture. Normal carpal  alignment.    LASHA FARR MD         SYSTEM ID:  [id number]       Assessment:  1. Injury of left wrist, subsequent encounter    2. Acute pain of left wrist    3. Wrist swelling, left    4. De  Quervain's disease (radial styloid tenosynovitis)    5. Left wrist tendinitis    6. Injury of left wrist, initial encounter        Plan:  Discussed the assessment with the patient.  Follow up: will contact with MRI results   XR images independently visualized and reviewed with patient again today in clinic  Using less breakthrough pain medication over time, refill provided today severe ongoing left wrist pain, will not plan any additional refills, no abuse concerns  Ongoing signs of acute tenosynovitis of the ECU tendon on exam and bedside US, similar appearance over new, focally tender 1st dorsal compartment tendons  Cannot completely r/o occult fracture, but still seems less likely  Given ongoing severe pain despite conservative measures, offered OT, MRI, injection vs oral steroid options  OT and injections declined for now  New thumb spica brace provided  MRI ordered for better diagnostic clarity  Oral prednisone rx provided, reviewed relative risks and limitations of steroid and oral steroid in particular  Limited diagnostic information, but she has several areas of ongoing focal pain and inflammation, short term measure to try to facilitate progress while further diagnostic information is pending  Oral Tylenol and topical Voltaren gel reviewed as safe OTC options, reviewed safe dosing strategies  RICE reviewed  We discussed modified progressive pain-free activity as tolerated  Supportive care reviewed  All questions were answered today  Contact us with additional questions or concerns  Signs and sx of concern reviewed      Oswaldo Maria DO, CHE  Sports Medicine Physician  [facility]      Time spent in chart review, one-on-one evaluation, discussion with patient regarding: nature of problem, clinical course, prior treatments, therapeutic options, shared-decision making, potential procedures and referrals, and charting related to the visit: 34 minutes.  If applicable, time does  not include time spent performing any procedure.            Disclaimer: This note consists of symbols derived from keyboarding, dictation and/or voice recognition software. As a result, there may be errors in the script that have gone undetected. Please consider this when interpreting information found in this chart.

## 2023-09-21 NOTE — LETTER
2023         RE: Aundrea Swenson  [address] [address] [facility] [address]        Dear Colleague,    Thank you for referring your patient, Aundrea Swenson, to the [facility] [place]. Please see a copy of my visit note below.    Aundrea Swenson  :  1970  DOS: 23  MRN: [id number]    Sports Medicine Clinic Visit    PCP: Lefty Hdz    Aundrea Swenson is a 52 year old female who is seen in consultation at the request of  Lorena Carpenter PA-C, as an ER referral presenting with right knee injury.    Injury: Patient describes injury as walking, stepped into pothole, twisted knee and then fell landing directly on anterior knee that occurred ~ 11 days ago (23) at [employer].  Pain located over right anterior lateral knee, lateral joint line, nonradiating.  Additional Features:  Positive: swelling, bruising and weakness.  Symptoms are better with Ibuprofen, Other medications: Percocet and Rest, compression brace.  Symptoms are worse with: lying in bed, going from sit to stand, walking, twisting, bending knee.  Other evaluation and/or treatments so far consists of: Ice, Tylenol, Ibuprofen, Other medications: Percocet, Rest and ER visit, crutches, compression brace.  Recent imaging completed: X-rays completed 23.  Prior History of related problems: history of similar right knee injury that occurred ~ 6 months ago.    Social History: currently employed as [employer]    Interim History - 2023  Since last visit on 2023 patient has minimal right knee pain over the past.  She does note periodic instability (1 - 2x/week), that did cause her to fall.  No interim injury.       Patient is also requesting to be seen to left wrist injury, ulnar sided pain that occurred ~ 2 weeks ago after falling and landing on left arm, due to her knee giving out.  Pain is worse with supination/pronation, moving wrist, and grasping objects.  Pain is better with wrist compression  "brace, no other formal treatment completed.  No prior imaging.  History of remote scaphoid injury > 10 years ago.    Interim History - 2023  Since last visit on 2023 patient has waxing and waning left ulnar sided wrist and left thumb CMC joint that is worse over the past 2 weeks.  Patient notes that she is currently helping care for her mother status post total knee arthroplasty.  Noted pop sensation in radial wrist after grasping hard on transfer belt.  Wearing wrist with minimal relief.  Currently using pain medication - oxycodone as needed, used last tab this AM.  No interim injury.           Review of Systems  Musculoskeletal: as above  Remainder of review of systems is negative including constitutional, CV, pulmonary, GI, Skin and Neurologic except as noted in HPI or medical history.    Past Medical History:   Diagnosis Date     CAD (coronary artery disease) ~    Found on angioplasty at [facility] during w/u for angina     Past Surgical History:   Procedure Laterality Date     ANTERIOR CRUCIATE LIGAMENT REPAIR        SECTION      x5     Family History   Problem Relation Age of Onset     Hypertension Mother      Cerebrovascular Disease Mother      Hypertension Sister      Fibromyalgia Mother      Fibromyalgia Sister      Neuropathy Sister      Diabetes Mother      Diabetes Sister      Thyroid Disease Mother      Thyroid Disease Sister      Scoliosis Sister      Breast Cancer Maternal Grandmother      Coronary Artery Disease Maternal Grandfather      Cerebrovascular Disease Maternal Grandfather        Objective  BP (!) 145/88   Ht 1.676 m (5' 6\")   Wt 81.6 kg (180 lb)   BMI 29.05 kg/m      General: healthy, alert and in no distress    HEENT: no scleral icterus or conjunctival erythema   Skin: no suspicious lesions or rash. No jaundice.   CV: regular rhythm by palpation, 2+ distal pulses, no pedal edema    Resp: normal respiratory effort without conversational dyspnea   Psych: normal " mood and affect    Gait: antalgic, appropriate coordination and balance   Neuro: normal light touch sensory exam of the extremities. Motor strength as noted below     Left wrist exam  Mild swelling generalized over the distal forearm and wrist, still no bruising appreciated  Still focally tender over the ECU tendon  Milder generalized tenderness to palpation about the dorsal wrist  New focal TTP over 1st dorsal compartment, milder over 1st CMC joint  Decreased terminal range of motion, hesitant to perform active extension, flexion, ulnar deviation and supination  Motor function intact    Bedside US reveals active tenosynovitis ECU and 1st dorsal compartment, mild 1st CMC joint effusion, no RC joint effusion, no apparent fracture    Radiology  Results for orders placed or performed during the hospital encounter of 06/02/23   XR Knee Right 3 Views    Narrative    EXAM: XR KNEE RIGHT 3 VIEWS  LOCATION: [facility]  DATE/TIME: 6/2/2023 5:04 PM CDT    INDICATION: Right knee pain after a fall.  COMPARISON: None.      Impression    IMPRESSION: Normal joint spaces and alignment. No fracture or joint effusion. Mild prepatellar soft tissue swelling. Tiny 1 mm superficial calcification versus skin foreign body in the anterolateral knee.   Pelvis XR w/ unilateral hip right    Narrative    EXAM: XR PELVIS AND HIP RIGHT 1 VIEW  LOCATION: [facility]  DATE/TIME: 6/2/2023 5:03 PM CDT    INDICATION: Right hip pain after a fall.  COMPARISON: None.      Impression    IMPRESSION: Negative right hip and pelvis. No fracture or concerning bone lesion. Normal joint alignment and spacing. Mild-moderate degenerative disc and facet changes at L4-L5 and L5-S1. Unremarkable SI joints.     Recent Results (from the past 744 hour(s))   XR Wrist Left G/E 3 Views    Narrative    XR WRIST LEFT G/E 3 VIEWS 7/27/2023 2:07 PM     HISTORY: Pain after injury    COMPARISON: None.         Impression     IMPRESSION: The left wrist is negative for fracture. Normal carpal  alignment.    LASHA FARR MD         SYSTEM ID:  [id number]       Assessment:  1. Injury of left wrist, subsequent encounter    2. Acute pain of left wrist    3. Wrist swelling, left    4. De Quervain's disease (radial styloid tenosynovitis)    5. Left wrist tendinitis    6. Injury of left wrist, initial encounter        Plan:  Discussed the assessment with the patient.  Follow up: will contact with MRI results   XR images independently visualized and reviewed with patient again today in clinic  Using less breakthrough pain medication over time, refill provided today severe ongoing left wrist pain, will not plan any additional refills, no abuse concerns  Ongoing signs of acute tenosynovitis of the ECU tendon on exam and bedside US, similar appearance over new, focally tender 1st dorsal compartment tendons  Cannot completely r/o occult fracture, but still seems less likely  Given ongoing severe pain despite conservative measures, offered OT, MRI, injection vs oral steroid options  OT and injections declined for now  New thumb spica brace provided  MRI ordered for better diagnostic clarity  Oral prednisone rx provided, reviewed relative risks and limitations of steroid and oral steroid in particular  Limited diagnostic information, but she has several areas of ongoing focal pain and inflammation, short term measure to try to facilitate progress while further diagnostic information is pending  Oral Tylenol and topical Voltaren gel reviewed as safe OTC options, reviewed safe dosing strategies  RICE reviewed  We discussed modified progressive pain-free activity as tolerated  Supportive care reviewed  All questions were answered today  Contact us with additional questions or concerns  Signs and sx of concern reviewed      Oswaldo Maria DO, CAQ  Sports Medicine Physician  [facility]      Time spent in chart review,  one-on-one evaluation, discussion with patient regarding: nature of problem, clinical course, prior treatments, therapeutic options, shared-decision making, potential procedures and referrals, and charting related to the visit: 34 minutes.  If applicable, time does not include time spent performing any procedure.            Disclaimer: This note consists of symbols derived from keyboarding, dictation and/or voice recognition software. As a result, there may be errors in the script that have gone undetected. Please consider this when interpreting information found in this chart.      Again, thank you for allowing me to participate in the care of your patient.        Sincerely,        Oswaldo Maria, DO

## 2023-09-30 ENCOUNTER — HOSPITAL ENCOUNTER (OUTPATIENT)
Dept: MRI IMAGING | Facility: CLINIC | Age: 53
Discharge: HOME | End: 2023-09-30
Attending: FAMILY MEDICINE | Admitting: FAMILY MEDICINE
Payer: COMMERCIAL

## 2023-09-30 DIAGNOSIS — M65.4 DE QUERVAIN'S DISEASE (RADIAL STYLOID TENOSYNOVITIS): ICD-10-CM

## 2023-09-30 DIAGNOSIS — M25.432 WRIST SWELLING, LEFT: ICD-10-CM

## 2023-09-30 DIAGNOSIS — M77.8 LEFT WRIST TENDINITIS: ICD-10-CM

## 2023-09-30 DIAGNOSIS — M25.532 ACUTE PAIN OF LEFT WRIST: ICD-10-CM

## 2023-09-30 DIAGNOSIS — S69.92XD INJURY OF LEFT WRIST, SUBSEQUENT ENCOUNTER: ICD-10-CM

## 2023-09-30 PROCEDURE — 73221 MRI JOINT UPR EXTREM W/O DYE: CPT | Mod: LT

## 2023-10-03 ENCOUNTER — TELEPHONE (OUTPATIENT)
Dept: ORTHOPEDICS | Facility: CLINIC | Age: 53
End: 2023-10-03
Payer: COMMERCIAL

## 2023-10-03 DIAGNOSIS — S69.92XD INJURY OF LEFT WRIST, SUBSEQUENT ENCOUNTER: ICD-10-CM

## 2023-10-03 DIAGNOSIS — M25.532 ACUTE PAIN OF LEFT WRIST: ICD-10-CM

## 2023-10-03 RX ORDER — OXYCODONE HYDROCHLORIDE 5 MG/1
5 TABLET ORAL EVERY 6 HOURS PRN
Qty: 10 TABLET | Refills: 0 | Status: SHIPPED | OUTPATIENT
Start: 2023-10-03 | End: 2024-07-26

## 2023-10-03 NOTE — TELEPHONE ENCOUNTER
Called and reviewed MRI.  Will plan to target ECU tendon sheath with injection, +/- volar ganglion vs CMC based on exam.  Please schedule at 240 Thursday 10/5.  Breakthrough pain medication refilled, will not plan to refill again.  All questions answered.    Oswaldo Maria DO, CAQ  Sports Medicine Physician  [facility]

## 2023-10-03 NOTE — TELEPHONE ENCOUNTER
Patient is asking for a call from Dr. Maria regarding her recent MRI. She states she is absolutely miserable, and is hoping Dr. Maria can give her some recommendations. She is also hoping to have a refill of her percocet prescription.    Please call patient at [phone], any time of day, and okay to leave a detailed msg. She is hoping to receive a call as soon as possible, per patient.

## 2023-10-05 ENCOUNTER — OFFICE VISIT (OUTPATIENT)
Dept: ORTHOPEDICS | Facility: CLINIC | Age: 53
End: 2023-10-05
Payer: COMMERCIAL

## 2023-10-05 VITALS — WEIGHT: 180 LBS | BODY MASS INDEX: 28.93 KG/M2 | HEIGHT: 66 IN

## 2023-10-05 DIAGNOSIS — M77.8 LEFT WRIST TENDINITIS: ICD-10-CM

## 2023-10-05 DIAGNOSIS — M25.432 WRIST SWELLING, LEFT: ICD-10-CM

## 2023-10-05 DIAGNOSIS — S69.92XD INJURY OF LEFT WRIST, SUBSEQUENT ENCOUNTER: Primary | ICD-10-CM

## 2023-10-05 DIAGNOSIS — M25.532 ACUTE PAIN OF LEFT WRIST: ICD-10-CM

## 2023-10-05 PROCEDURE — 20550 NJX 1 TENDON SHEATH/LIGAMENT: CPT | Mod: LT | Performed by: FAMILY MEDICINE

## 2023-10-05 PROCEDURE — 76942 ECHO GUIDE FOR BIOPSY: CPT | Performed by: FAMILY MEDICINE

## 2023-10-05 RX ADMIN — LIDOCAINE HYDROCHLORIDE 2 ML: 10 INJECTION, SOLUTION INFILTRATION; PERINEURAL at 15:00

## 2023-10-05 RX ADMIN — TRIAMCINOLONE ACETONIDE 40 MG: 40 INJECTION, SUSPENSION INTRA-ARTICULAR; INTRAMUSCULAR at 15:00

## 2023-10-05 NOTE — LETTER
10/5/2023         RE: Aundrea Swenson  [address] [address] [address]        Dear Colleague,    Thank you for referring your patient, Aundrea Swenson, to the [facility] [place]. Please see a copy of my visit note below.    Aundrea Swenson  :  1970  DOS: 2023  MRN: [id number]    Sports Medicine Clinic Procedure    Ultrasound Guided Left ECU Tendon Sheath Injection    Clinical History: ongoing wrist pain, here for injection trial as previously discussed  Diagnosis:   1. Injury of left wrist, subsequent encounter    2. Acute pain of left wrist    3. Left wrist tendinitis    4. Wrist swelling, left      Hand / Upper Extremity Injection/Arthrocentesis: L extensor compartment 6    Date/Time: 10/5/2023 3:00 PM    Performed by: Oswaldo Maria DO  Authorized by: Oswaldo Maria DO    Indications:  Tendon swelling, therapeutic and pain  Needle Size:  25 G  Guidance: ultrasound    Approach:  Ulnar  Condition: 6th dorsal compartment      Site:  L extensor compartment 6  Medications:  40 mg triamcinolone 40 MG/ML; 2 mL lidocaine 1 %  Outcome:  Tolerated well, no immediate complications  Procedure discussed: discussed risks, benefits, and alternatives    Consent Given by:  Patient  Timeout: timeout called immediately prior to procedure    Prep: patient was prepped and draped in usual sterile fashion     Ultrasound images of procedure were permanently stored.         Impression:  Successful US guided CSI to left 6th dorsal compartment    Plan:  Follow up prn based on clinical progress  PT options and bracing strategies reviewed  Expectations and goals of CSI reviewed  Often 2-3 days for steroid effect, and can take up to two weeks for maximum effect  We discussed modified progressive pain-free activity as tolerated  Do not overuse in first two weeks if feeling better due to concern for vulnerability while steroid is working  Supportive care reviewed  All  questions were answered today  Contact us with additional questions or concerns  Signs and sx of concern reviewed        Oswaldo Maria DO, CAQ  Primary Care Sports Medicine  [facility]           Again, thank you for allowing me to participate in the care of your patient.        Sincerely,        Oswaldo Maria DO

## 2023-10-05 NOTE — PROGRESS NOTES
Aundrea Swenson  :  1970  DOS: 2023  MRN: [id number]    Sports Medicine Clinic Procedure    Ultrasound Guided Left ECU Tendon Sheath Injection    Clinical History: ongoing wrist pain, here for injection trial as previously discussed  Diagnosis:   1. Injury of left wrist, subsequent encounter    2. Acute pain of left wrist    3. Left wrist tendinitis    4. Wrist swelling, left      Hand / Upper Extremity Injection/Arthrocentesis: L extensor compartment 6    Date/Time: 10/5/2023 3:00 PM    Performed by: Oswaldo Maria DO  Authorized by: Oswaldo Maria DO    Indications:  Tendon swelling, therapeutic and pain  Needle Size:  25 G  Guidance: ultrasound    Approach:  Ulnar  Condition: 6th dorsal compartment      Site:  L extensor compartment 6  Medications:  40 mg triamcinolone 40 MG/ML; 2 mL lidocaine 1 %  Outcome:  Tolerated well, no immediate complications  Procedure discussed: discussed risks, benefits, and alternatives    Consent Given by:  Patient  Timeout: timeout called immediately prior to procedure    Prep: patient was prepped and draped in usual sterile fashion     Ultrasound images of procedure were permanently stored.         Impression:  Successful US guided CSI to left 6th dorsal compartment    Plan:  Follow up prn based on clinical progress  PT options and bracing strategies reviewed  Expectations and goals of CSI reviewed  Often 2-3 days for steroid effect, and can take up to two weeks for maximum effect  We discussed modified progressive pain-free activity as tolerated  Do not overuse in first two weeks if feeling better due to concern for vulnerability while steroid is working  Supportive care reviewed  All questions were answered today  Contact us with additional questions or concerns  Signs and sx of concern reviewed        Oswaldo Maria DO, CAQ  Primary Care Sports Medicine  [facility]

## 2023-10-20 ENCOUNTER — TELEPHONE (OUTPATIENT)
Dept: ORTHOPEDICS | Facility: CLINIC | Age: 53
End: 2023-10-20
Payer: COMMERCIAL

## 2023-10-20 DIAGNOSIS — S69.92XD INJURY OF LEFT WRIST, SUBSEQUENT ENCOUNTER: Primary | ICD-10-CM

## 2023-10-20 NOTE — TELEPHONE ENCOUNTER
Pt requesting a call from the care team regarding questions on her injections. Please call cell number on account

## 2023-10-20 NOTE — CONFIDENTIAL NOTE
Contacted patient regarding left 6th extensor compartment steroid injection. She has numbness over the pinky, 4th digit and palmar side of hand. She is uncertain if the injection helped. Numbness is persisting. She is currently using wrist brace for 2 weeks.  She is worried that symptoms may be long term.  No weakness. Patient reports pain in the wrist brace feels a lot better.  Given this we will have her continue the wrist brace and will message Dr. Maria and he will reach out upon his return for follow-up.  Patient understands and agrees with plan.    Gurpreet Kahn MD

## 2023-10-20 NOTE — TELEPHONE ENCOUNTER
Patient seen Dr. Maria on 10/5/23 for left extensor compartment 6 injection. States her 3rd, 4th, and 5th finger have been numb ever since she received the injection. She is very concerned to use her hand as she worries she is going to cause damage to the area if she can not feel pain. She is concerned if the injection helped or is this is a normal reaction after the injection?     She would like to hear back from someone today regarding next steps as she is very worried. Wondering if there is a medication she can take or if she needs to follow up in an urgent care.     Advised patient that I would forward her concern to Dr. Kahn to see if he has any recommendations for patient.     Jamila ATC

## 2023-10-24 RX ORDER — TRIAMCINOLONE ACETONIDE 40 MG/ML
40 INJECTION, SUSPENSION INTRA-ARTICULAR; INTRAMUSCULAR
Status: DISCONTINUED | OUTPATIENT
Start: 2023-10-05 | End: 2024-07-26

## 2023-10-24 RX ORDER — LIDOCAINE HYDROCHLORIDE 10 MG/ML
2 INJECTION, SOLUTION INFILTRATION; PERINEURAL
Status: DISCONTINUED | OUTPATIENT
Start: 2023-10-05 | End: 2024-07-26

## 2023-10-24 NOTE — TELEPHONE ENCOUNTER
Spoke to patient discussed reassurance and recommendations from Dr Maria below.  She describes that majority of numbness/tingling is over dorsal aspect of ulnar hand and long - little fingers, infrequently to volar aspect of this area.  Advised that may trial transitioning away from wrist brace at this provided that she does not notice and increased pain.  Reviewed anticipation that sensory nerve issues should slowly improve over the next 2 - 4 weeks.  She should reach out to clinic with worsening symptoms or decreased function.  OT available, if desired.    Arturo Velásquez, ATC

## 2023-10-24 NOTE — TELEPHONE ENCOUNTER
Please reach out to Aundrea to check in.  I am glad to hear that the pain is improved overall.  It does not sound like motor nerves are affected, and it is perfectly safe for her to use her hand.  I suspect that there may have been a block of the dorsal sensory branch of the ulnar nerve.  This is not dangerous and sensation should return soon if she still has numbness.  Please confirm that she feels this dorsal vs palmar side.  Can answer additional questions if needed.  OT available of course anytime to help with ongoing improvements in overall pain.    Thanks,    Oswaldo Maria DO, CAQ  Sports Medicine Physician  [facility]

## 2024-07-26 ENCOUNTER — OFFICE VISIT (OUTPATIENT)
Dept: ORTHOPEDICS | Facility: CLINIC | Age: 54
End: 2024-07-26
Payer: COMMERCIAL

## 2024-07-26 DIAGNOSIS — M77.8 EXTENSOR CARPI ULNARIS TENDINITIS: Primary | ICD-10-CM

## 2024-07-26 DIAGNOSIS — M25.532 LEFT WRIST PAIN: ICD-10-CM

## 2024-07-26 PROCEDURE — 76942 ECHO GUIDE FOR BIOPSY: CPT | Performed by: FAMILY MEDICINE

## 2024-07-26 PROCEDURE — 99214 OFFICE O/P EST MOD 30 MIN: CPT | Mod: 25 | Performed by: FAMILY MEDICINE

## 2024-07-26 PROCEDURE — 20550 NJX 1 TENDON SHEATH/LIGAMENT: CPT | Mod: LT | Performed by: FAMILY MEDICINE

## 2024-07-26 RX ORDER — HYDROCODONE BITARTRATE AND ACETAMINOPHEN 5; 325 MG/1; MG/1
1 TABLET ORAL EVERY 6 HOURS PRN
Qty: 10 TABLET | Refills: 0 | Status: SHIPPED | OUTPATIENT
Start: 2024-07-26 | End: 2024-07-29

## 2024-07-26 RX ORDER — BETAMETHASONE SODIUM PHOSPHATE AND BETAMETHASONE ACETATE 3; 3 MG/ML; MG/ML
6 INJECTION, SUSPENSION INTRA-ARTICULAR; INTRALESIONAL; INTRAMUSCULAR; SOFT TISSUE
Status: SHIPPED | OUTPATIENT
Start: 2024-07-26

## 2024-07-26 RX ORDER — ROPIVACAINE HYDROCHLORIDE 5 MG/ML
1 INJECTION, SOLUTION EPIDURAL; INFILTRATION; PERINEURAL
Status: SHIPPED | OUTPATIENT
Start: 2024-07-26

## 2024-07-26 RX ADMIN — ROPIVACAINE HYDROCHLORIDE 1 ML: 5 INJECTION, SOLUTION EPIDURAL; INFILTRATION; PERINEURAL at 16:08

## 2024-07-26 RX ADMIN — BETAMETHASONE SODIUM PHOSPHATE AND BETAMETHASONE ACETATE 6 MG: 3; 3 INJECTION, SUSPENSION INTRA-ARTICULAR; INTRALESIONAL; INTRAMUSCULAR; SOFT TISSUE at 16:08

## 2024-07-26 ASSESSMENT — PAIN SCALES - GENERAL: PAINLEVEL: EXTREME PAIN (8)

## 2024-07-26 NOTE — LETTER
7/26/2024      Aundrea Swenson  [id number] [address] [id number]      Dear Colleague,    Thank you for referring your patient, Aundrea Swenson, to the [facility] [place]. Please see a copy of my visit note below.    ASSESSMENT & PLAN    Aundrea was seen today for pain.    Diagnoses and all orders for this visit:    Extensor carpi ulnaris tendinitis  -     Occupational Therapy  Referral; Future  -     Hand / Upper Extremity Injection/Arthrocentesis: L extensor compartment 6  -     HYDROcodone-acetaminophen (NORCO) 5-325 MG tablet; Take 1 tablet by mouth every 6 hours as needed for severe pain    Left wrist pain  -     Occupational Therapy  Referral; Future  -     Hand / Upper Extremity Injection/Arthrocentesis: L extensor compartment 6  -     HYDROcodone-acetaminophen (NORCO) 5-325 MG tablet; Take 1 tablet by mouth every 6 hours as needed for severe pain        # Acute on Chronic Left Wrist Pain: Aundrea Swenson  was seen today for left wrist pain. Symptoms had been going on for 1+ years worsening over the past few months. On examination there are positive findings of tenderness to palpation over the left extensor carpi ulnaris tendon, pain with stretching tendon. Imaging findings showed irritated ECU tendon on previous MRI. Previous steroid injection helped. Likely cause of patient's condition due to irritated ECU tendon.   Counseled patient on nature of condition and treatment options.  Given this plan as below, follow-up 1+ mon as needed.    [place] the [place] showed no unexpected activity     Image Findings: irritated ECU tendon on previous MRI  Treatment: Activities as tolerated, home exercises given today  Medications/Injections: a one time Norco prescription ordered,Topical Voltaren gel, left ECU tendon steroid injection   Follow-up: In one month if symptoms do not improve, sooner if worsening  Can consider repeat  evaluation    -----    SUBJECTIVE:  Aundrea Swenson is a 53 year old female who is seen in follow-up for left wrist pain. They were last seen 10/5/23 and left ECU steroid injection was performed.  Provided good relief for several months. The patient is seen by themselves.    Since their last visit reports returned left wrist pain and swelling.  They indicate that their current pain level is 8/10. They have tried left ECU steroid injection 10/5/23, oxycodone, prednisone.        Patient's past medical, surgical, social, and family histories were reviewed today and no changes are noted.    REVIEW OF SYSTEMS:  Constitutional: NEGATIVE for fever, chills, change in weight  Skin: NEGATIVE for worrisome rashes, moles or lesions  GI/: NEGATIVE for bowel or bladder changes  Neuro: NEGATIVE for weakness, dizziness or paresthesias    OBJECTIVE:  There were no vitals taken for this visit.   General: healthy, alert and in no distress  HEENT: no scleral icterus or conjunctival erythema  Skin: no suspicious lesions or rash. No jaundice.  CV: regular rhythm by palpation, no pedal edema  Resp: normal respiratory effort without conversational dyspnea   Psych: normal mood and affect  Gait: normal steady gait with appropriate coordination and balance  Neuro: normal light touch sensory exam of the extremities.    MSK:    LEFT WRIST  Inspection:    No swelling or obvious deformity or asymmetry  Palpation:    Tender about the ECU tendon. Remainder of bony and ligamentous line marks are nontender.     Metacarpals: normal    Thumb: normal    Fingers: normal  Range of Motion:    ROM (active and passive) limited in all planes secondary to pain  Strength: Pain with ECU testing    No deficits in flexion, extension, ulnar/radial deviation, or  strength.. Normal pinch strength.  Special Tests:    Positive: None    Negative: thenar eminence wasting, hypothenar eminence wasting.     Independent visualization of the below image:  Results for  orders placed or performed during the hospital encounter of 09/30/23   MR Wrist Left w/o Contrast    Narrative    EXAM: MR WRIST LEFT WITHOUT CONTRAST  LOCATION: [facility]  DATE: 09/30/2023    INDICATION: Two months status post injury, suspect ECU and first dorsal compartment tenosynovitis, evaluate for other regional bony and soft tissue pathology, degree of tendon damage.  COMPARISON: 07/27/2023.  TECHNIQUE: Unenhanced.    FINDINGS:     TENDONS:   -Extensor compartment tendons: There is tenosynovitis at the distal extensor carpi ulnaris. No tendon tear or tendinopathy. Remaining extensor tendons are unremarkable.  -Flexor compartment tendons: No tear, tendinopathy, or gerri tenosynovitis.    TRIANGULAR FIBROCARTILAGE COMPLEX:   -Articular disc: Intact.  -Peripheral tissue: There is localized inflammation and synovitis along the volar aspect of the peripheral tissue near the ulnar recess and radial styloid as seen on coronal image 8. There is intrasubstance degeneration and signal changes involving   portions of the radioulnar ligaments without evidence of an acute or high-grade tear.    LIGAMENTS:   -Scapholunate and lunotriquetral ligament: No tear identified on this non-arthrographic study.    JOINTS AND BONES:   -Partial-thickness cartilage loss first CMC joint where there is faint subchondral bone marrow edema. No acute fracture. There is no osteonecrosis or malalignment.    SOFT TISSUES:   -Ganglion/Synovial cyst: There is a volar multilobulated synovial cyst/ganglion along the radial aspect of the wrist measuring approximately 7 x 7 mm which abuts the radial neurovascular bundle as seen on axial image 21.  -Nerves: Visualized median and ulnar nerves appear intact. Guyon's canal is normal.      Impression    IMPRESSION:  1.  Distal extensor carpi ulnaris tenosynovitis. Adjacent to this area is inflammatory change and synovitis within the ulnar recess with adjacent intrasubstance  degeneration and signal changes of the radioulnar ligaments, either related to degeneration   or a prior injury. No acute or full-thickness tear identified.  2.  Ganglion/synovial cyst along the volar radial aspect of the wrist measuring up to 7 mm.  3.  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              Gurpreet Kahn MD, [facility]    Disclaimer: This note consists of symbols derived from keyboarding, dictation and/or voice recognition software. As a result, there may be errors in the script that have gone undetected. Please consider this when interpreting information found in this chart.    Hand / Upper Extremity Injection/Arthrocentesis: L extensor compartment 6    Date/Time: 7/26/2024 4:08 PM    Performed by: Gurpreet Kahn MD  Authorized by: Gurpreet Kahn MD    Indications:  Pain and therapeutic  Needle Size:  25 G  Guidance: ultrasound    Approach:  Lateral  Condition: 6th dorsal compartment      Site:  L extensor compartment 6  Medications:  6 mg betamethasone acet & sod phos 6 (3-3) MG/ML; 1 mL ROPivacaine 5 MG/ML  Outcome:  Tolerated well, no immediate complications  Procedure discussed: discussed risks, benefits, and alternatives    Consent Given by:  Patient  Timeout: timeout called immediately prior to procedure    Prep: patient was prepped and draped in usual sterile fashion     Ultrasound images of procedure were permanently stored.           Again, thank you for allowing me to participate in the care of your patient.        Sincerely,        Gurpreet Kahn MD

## 2024-07-26 NOTE — PATIENT INSTRUCTIONS
# Acute on Chronic Left Wrist Pain: Aundrea Swenson  was seen today for left wrist pain. Symptoms had been going on for 1+ years worsening over the past few months. On examination there are positive findings of tenderness to palpation over the left extensor carpi ulnaris tendon, pain with stretching tendon. Imaging findings showed irritated ECU tendon on previous MRI. Previous steroid injection helped. Likely cause of patient's condition due to irritated ECU tendon.   Counseled patient on nature of condition and treatment options.  Given this plan as below, follow-up 1+ mon as needed.    [place] no unexpected activity     Image Findings: irritated ECU tendon on previous MRI  Treatment: Activities as tolerated, home exercises given today  Medications/Injections: a one time Norco prescription ordered,Topical Voltaren gel, left ECU tendon steroid injection   Follow-up: In one month if symptoms do not improve, sooner if worsening  Can consider repeat evaluation    Please call [phone]   Ask for my team if you have any questions or concerns    If you have not yet received the influenza vaccine but would like to get one, please call  [phone] or you can schedule via [employer]    It was great seeing you today!    Gurpreet Kahn MD, [facility]     FS Injection Discharge Instructions    Procedure: left extensor carpi ulnaris tendon ECU steroid injection     You may shower, however avoid swimming, tub baths or hot tubs for 24 hours following your procedure  You may have a mild to moderate increase in pain for several days following the injection.  It may take up to 14 days for the steroid medication to start working although you may feel the effect as early as a few days after the procedure.  You may use ice packs for 10-15 minutes, 3 to 4 times a day at the injection site for comfort  You may use anti-inflammatory medications (such as Ibuprofen or Aleve or Advil) or Tylenol for  pain control if necessary  If you were fasting, you may resume your normal diet and medications after the procedure  If you have diabetes, check your blood sugar more frequently than usual as your blood sugar may be higher than normal for 10-14 days following a steroid injection. Contact your doctor who manages your diabetes if your blood sugar is higher than usual    If you experience any of the following, call [facility] @ [phone] or [phone]  -Fever over 100 degree F  -Swelling, bleeding, redness, drainage, warmth at the injection site  - New or worsening pain

## 2024-07-26 NOTE — PROGRESS NOTES
ASSESSMENT & PLAN    Aundrea was seen today for pain.    Diagnoses and all orders for this visit:    Extensor carpi ulnaris tendinitis  -     Occupational Therapy  Referral; Future  -     Hand / Upper Extremity Injection/Arthrocentesis: L extensor compartment 6  -     HYDROcodone-acetaminophen (NORCO) 5-325 MG tablet; Take 1 tablet by mouth every 6 hours as needed for severe pain    Left wrist pain  -     Occupational Therapy  Referral; Future  -     Hand / Upper Extremity Injection/Arthrocentesis: L extensor compartment 6  -     HYDROcodone-acetaminophen (NORCO) 5-325 MG tablet; Take 1 tablet by mouth every 6 hours as needed for severe pain        # Acute on Chronic Left Wrist Pain: Aundrea Swenson  was seen today for left wrist pain. Symptoms had been going on for 1+ years worsening over the past few months. On examination there are positive findings of tenderness to palpation over the left extensor carpi ulnaris tendon, pain with stretching tendon. Imaging findings showed irritated ECU tendon on previous MRI. Previous steroid injection helped. Likely cause of patient's condition due to irritated ECU tendon.   Counseled patient on nature of condition and treatment options.  Given this plan as below, follow-up 1+ mon as needed.    [place] showed no unexpected activity     Image Findings: irritated ECU tendon on previous MRI  Treatment: Activities as tolerated, home exercises given today  Medications/Injections: a one time Norco prescription ordered,Topical Voltaren gel, left ECU tendon steroid injection   Follow-up: In one month if symptoms do not improve, sooner if worsening  Can consider repeat evaluation    -----    SUBJECTIVE:  Aundrea Swenson is a 53 year old female who is seen in follow-up for left wrist pain. They were last seen 10/5/23 and left ECU steroid injection was performed.  Provided good relief for several months. The patient is seen by themselves.    Since  their last visit reports returned left wrist pain and swelling.  They indicate that their current pain level is 8/10. They have tried left ECU steroid injection 10/5/23, oxycodone, prednisone.        Patient's past medical, surgical, social, and family histories were reviewed today and no changes are noted.    REVIEW OF SYSTEMS:  Constitutional: NEGATIVE for fever, chills, change in weight  Skin: NEGATIVE for worrisome rashes, moles or lesions  GI/: NEGATIVE for bowel or bladder changes  Neuro: NEGATIVE for weakness, dizziness or paresthesias    OBJECTIVE:  There were no vitals taken for this visit.   General: healthy, alert and in no distress  HEENT: no scleral icterus or conjunctival erythema  Skin: no suspicious lesions or rash. No jaundice.  CV: regular rhythm by palpation, no pedal edema  Resp: normal respiratory effort without conversational dyspnea   Psych: normal mood and affect  Gait: normal steady gait with appropriate coordination and balance  Neuro: normal light touch sensory exam of the extremities.    MSK:    LEFT WRIST  Inspection:    No swelling or obvious deformity or asymmetry  Palpation:    Tender about the ECU tendon. Remainder of bony and ligamentous line marks are nontender.     Metacarpals: normal    Thumb: normal    Fingers: normal  Range of Motion:    ROM (active and passive) limited in all planes secondary to pain  Strength: Pain with ECU testing    No deficits in flexion, extension, ulnar/radial deviation, or  strength.. Normal pinch strength.  Special Tests:    Positive: None    Negative: thenar eminence wasting, hypothenar eminence wasting.     Independent visualization of the below image:  Results for orders placed or performed during the hospital encounter of 09/30/23   MR Wrist Left w/o Contrast    Narrative    EXAM: MR WRIST LEFT WITHOUT CONTRAST  LOCATION: [facility]  DATE: 09/30/2023    INDICATION: Two months status post injury, suspect ECU and  first dorsal compartment tenosynovitis, evaluate for other regional bony and soft tissue pathology, degree of tendon damage.  COMPARISON: 07/27/2023.  TECHNIQUE: Unenhanced.    FINDINGS:     TENDONS:   -Extensor compartment tendons: There is tenosynovitis at the distal extensor carpi ulnaris. No tendon tear or tendinopathy. Remaining extensor tendons are unremarkable.  -Flexor compartment tendons: No tear, tendinopathy, or gerri tenosynovitis.    TRIANGULAR FIBROCARTILAGE COMPLEX:   -Articular disc: Intact.  -Peripheral tissue: There is localized inflammation and synovitis along the volar aspect of the peripheral tissue near the ulnar recess and radial styloid as seen on coronal image 8. There is intrasubstance degeneration and signal changes involving   portions of the radioulnar ligaments without evidence of an acute or high-grade tear.    LIGAMENTS:   -Scapholunate and lunotriquetral ligament: No tear identified on this non-arthrographic study.    JOINTS AND BONES:   -Partial-thickness cartilage loss first CMC joint where there is faint subchondral bone marrow edema. No acute fracture. There is no osteonecrosis or malalignment.    SOFT TISSUES:   -Ganglion/Synovial cyst: There is a volar multilobulated synovial cyst/ganglion along the radial aspect of the wrist measuring approximately 7 x 7 mm which abuts the radial neurovascular bundle as seen on axial image 21.  -Nerves: Visualized median and ulnar nerves appear intact. Guyon's canal is normal.      Impression    IMPRESSION:  1.  Distal extensor carpi ulnaris tenosynovitis. Adjacent to this area is inflammatory change and synovitis within the ulnar recess with adjacent intrasubstance degeneration and signal changes of the radioulnar ligaments, either related to degeneration   or a prior injury. No acute or full-thickness tear identified.  2.  Ganglion/synovial cyst along the volar radial aspect of the wrist measuring up to 7 mm.  3.  Mild to moderate first CMC  joint arthrosis, with faint subchondral bone marrow edema.              Gurpreet Kahn MD, [facility]    Disclaimer: This note consists of symbols derived from keyboarding, dictation and/or voice recognition software. As a result, there may be errors in the script that have gone undetected. Please consider this when interpreting information found in this chart.    Hand / Upper Extremity Injection/Arthrocentesis: L extensor compartment 6    Date/Time: 7/26/2024 4:08 PM    Performed by: Gurpreet Kahn MD  Authorized by: Gurpreet Kahn MD    Indications:  Pain and therapeutic  Needle Size:  25 G  Guidance: ultrasound    Approach:  Lateral  Condition: 6th dorsal compartment      Site:  L extensor compartment 6  Medications:  6 mg betamethasone acet & sod phos 6 (3-3) MG/ML; 1 mL ROPivacaine 5 MG/ML  Outcome:  Tolerated well, no immediate complications  Procedure discussed: discussed risks, benefits, and alternatives    Consent Given by:  Patient  Timeout: timeout called immediately prior to procedure    Prep: patient was prepped and draped in usual sterile fashion     Ultrasound images of procedure were permanently stored.

## 2024-10-08 ENCOUNTER — OFFICE VISIT (OUTPATIENT)
Dept: ORTHOPEDICS | Facility: CLINIC | Age: 54
End: 2024-10-08
Payer: COMMERCIAL

## 2024-10-08 VITALS
DIASTOLIC BLOOD PRESSURE: 82 MMHG | BODY MASS INDEX: 30.05 KG/M2 | HEIGHT: 66 IN | WEIGHT: 187 LBS | SYSTOLIC BLOOD PRESSURE: 117 MMHG

## 2024-10-08 DIAGNOSIS — M77.8 EXTENSOR CARPI ULNARIS TENDINITIS: Primary | ICD-10-CM

## 2024-10-08 DIAGNOSIS — M25.532 LEFT WRIST PAIN: ICD-10-CM

## 2024-10-08 PROCEDURE — 99214 OFFICE O/P EST MOD 30 MIN: CPT | Performed by: FAMILY MEDICINE

## 2024-10-08 RX ORDER — HYDROCODONE BITARTRATE AND ACETAMINOPHEN 5; 325 MG/1; MG/1
.5-1 TABLET ORAL EVERY 6 HOURS PRN
Qty: 10 TABLET | Refills: 0 | Status: SHIPPED | OUTPATIENT
Start: 2024-10-08 | End: 2024-10-11

## 2024-10-08 NOTE — PROGRESS NOTES
"Aundrea Swenson  :  1970  DOS: 10/08/24  MRN: [id number]    Sports Medicine Clinic Visit    PCP: Lefty Hzd    Aundrea Swenson is a 52 year old female who is seen in follow up presenting with chronic left wrist pain.    Interim History - 2024  Since last visit on 2024 with Dr Kahn patient has moderate-severe left ulnar wrist and hand pain over past 6+ weeks.  Left wrist ECU injection completed on 24 provided relief for ~ 5 weeks.  Patient notes worsening pain with gripping/grasping & sleeping at night.  She is wearing wrist brace majority of time due to pain.  No new injury in the interim.    Review of Systems  Musculoskeletal: as above  Remainder of review of systems is negative including constitutional, CV, pulmonary, GI, Skin and Neurologic except as noted in HPI or medical history.    Past Medical History:   Diagnosis Date    CAD (coronary artery disease) ~    Found on angioplasty at [facility] during w/u for angina     Past Surgical History:   Procedure Laterality Date    ANTERIOR CRUCIATE LIGAMENT REPAIR       SECTION      x5     Family History   Problem Relation Age of Onset    Hypertension Mother     Cerebrovascular Disease Mother     Hypertension Sister     Fibromyalgia Mother     Fibromyalgia Sister     Neuropathy Sister     Diabetes Mother     Diabetes Sister     Thyroid Disease Mother     Thyroid Disease Sister     Scoliosis Sister     Breast Cancer Maternal Grandmother     Coronary Artery Disease Maternal Grandfather     Cerebrovascular Disease Maternal Grandfather        Objective  /82   Ht 1.676 m (5' 6\")   Wt 84.8 kg (187 lb)   BMI 30.18 kg/m      General: healthy, alert and in no distress    HEENT: no scleral icterus or conjunctival erythema   Skin: no suspicious lesions or rash. No jaundice.   CV: regular rhythm by palpation, 2+ distal pulses, no pedal edema    Resp: normal respiratory effort without conversational dyspnea   Psych: normal mood and " affect    Gait: antalgic, appropriate coordination and balance   Neuro: normal light touch sensory exam of the extremities. Motor strength as noted below     Left wrist exam  Mild swelling generalized over the distal forearm and wrist, still no bruising appreciated  Still focally tender over the ECU tendon, regional mild edema  Milder generalized tenderness to palpation about the dorsal wrist  Decreased terminal range of motion, hesitant to perform active extension, flexion, ulnar deviation and supination  Motor function intact    Radiology  Results for orders placed or performed during the hospital encounter of 06/02/23   XR Knee Right 3 Views    Narrative    EXAM: XR KNEE RIGHT 3 VIEWS  LOCATION: [facility]  DATE/TIME: 6/2/2023 5:04 PM CDT    INDICATION: Right knee pain after a fall.  COMPARISON: None.      Impression    IMPRESSION: Normal joint spaces and alignment. No fracture or joint effusion. Mild prepatellar soft tissue swelling. Tiny 1 mm superficial calcification versus skin foreign body in the anterolateral knee.   Pelvis XR w/ unilateral hip right    Narrative    EXAM: XR PELVIS AND HIP RIGHT 1 VIEW  LOCATION: [facility]  DATE/TIME: 6/2/2023 5:03 PM CDT    INDICATION: Right hip pain after a fall.  COMPARISON: None.      Impression    IMPRESSION: Negative right hip and pelvis. No fracture or concerning bone lesion. Normal joint alignment and spacing. Mild-moderate degenerative disc and facet changes at L4-L5 and L5-S1. Unremarkable SI joints.     Recent Results (from the past 744 hour(s))   XR Wrist Left G/E 3 Views    Narrative    XR WRIST LEFT G/E 3 VIEWS 7/27/2023 2:07 PM     HISTORY: Pain after injury    COMPARISON: None.         Impression    IMPRESSION: The left wrist is negative for fracture. Normal carpal  alignment.    LASHA FARR MD         SYSTEM ID:  [id number]       Assessment:  1. Extensor carpi ulnaris tendinitis    2. Left wrist  pain        Plan:  Discussed the assessment with the patient.  Follow up: will contact with MRI results   XR images independently visualized and reviewed with patient again today in clinic  Using less breakthrough pain medication over time, refill provided today severe ongoing left wrist pain, no abuse concerns  Ongoing signs of acute tenosynovitis of the ECU tendon on exam and bedside US  Given ongoing severe pain despite conservative measures, offered OT, consider repeat MRI, injection vs oral steroid options  OT and injections declined for now  New wrist brace provided, no longer needs thumb spica  Prior MRI reviewed  Oral prednisone rx deferred, did not help much last time  Oral Tylenol and topical Voltaren gel reviewed as safe OTC options, reviewed safe dosing strategies  Consider repeat CSI but lower expectations given result last time with Dr Kahn, reviewed risks and reservations, deferred for today as it has not yet been 3 months  Reviewed option to consult with ortho, referral placed today  RICE reviewed  We discussed modified progressive pain-free activity as tolerated  Supportive care reviewed  All questions were answered today  Contact us with additional questions or concerns  Signs and sx of concern reviewed      Oswaldo Maria DO, CHE  Sports Medicine Physician  [facility]      Time spent in chart review, one-on-one evaluation, discussion with patient regarding: nature of problem, clinical course, prior treatments, therapeutic options, shared-decision making, potential procedures and referrals, and charting related to the visit: 32 minutes.  If applicable, time does not include time spent performing any procedure.            Disclaimer: This note consists of symbols derived from keyboarding, dictation and/or voice recognition software. As a result, there may be errors in the script that have gone undetected. Please consider this when interpreting information found  in this chart.

## 2024-10-08 NOTE — LETTER
"10/8/2024      Aundrea Swenson  [address] [facility] [address]      Dear Colleague,    Thank you for referring your patient, Aundrea Swenson, to the [facility] [place]. Please see a copy of my visit note below.    Aundrea Swenson  :  1970  DOS: 10/08/24  MRN: [id number]    Sports Medicine Clinic Visit    PCP: Lefty Hdz    Aundrea Swenson is a 52 year old female who is seen in follow up presenting with chronic left wrist pain.    Interim History - 2024  Since last visit on 2024 with Dr Kahn patient has moderate-severe left ulnar wrist and hand pain over past 6+ weeks.  Left wrist ECU injection completed on 24 provided relief for ~ 5 weeks.  Patient notes worsening pain with gripping/grasping & sleeping at night.  She is wearing wrist brace majority of time due to pain.  No new injury in the interim.    Review of Systems  Musculoskeletal: as above  Remainder of review of systems is negative including constitutional, CV, pulmonary, GI, Skin and Neurologic except as noted in HPI or medical history.    Past Medical History:   Diagnosis Date     CAD (coronary artery disease) ~    Found on angioplasty at [facility] during w/u for angina     Past Surgical History:   Procedure Laterality Date     ANTERIOR CRUCIATE LIGAMENT REPAIR        SECTION      x5     Family History   Problem Relation Age of Onset     Hypertension Mother      Cerebrovascular Disease Mother      Hypertension Sister      Fibromyalgia Mother      Fibromyalgia Sister      Neuropathy Sister      Diabetes Mother      Diabetes Sister      Thyroid Disease Mother      Thyroid Disease Sister      Scoliosis Sister      Breast Cancer Maternal Grandmother      Coronary Artery Disease Maternal Grandfather      Cerebrovascular Disease Maternal Grandfather        Objective  /82   Ht 1.676 m (5' 6\")   Wt 84.8 kg (187 lb)   BMI 30.18 kg/m      General: healthy, alert and in no distress  "   HEENT: no scleral icterus or conjunctival erythema   Skin: no suspicious lesions or rash. No jaundice.   CV: regular rhythm by palpation, 2+ distal pulses, no pedal edema    Resp: normal respiratory effort without conversational dyspnea   Psych: normal mood and affect    Gait: antalgic, appropriate coordination and balance   Neuro: normal light touch sensory exam of the extremities. Motor strength as noted below     Left wrist exam  Mild swelling generalized over the distal forearm and wrist, still no bruising appreciated  Still focally tender over the ECU tendon, regional mild edema  Milder generalized tenderness to palpation about the dorsal wrist  Decreased terminal range of motion, hesitant to perform active extension, flexion, ulnar deviation and supination  Motor function intact    Radiology  Results for orders placed or performed during the hospital encounter of 06/02/23   XR Knee Right 3 Views    Narrative    EXAM: XR KNEE RIGHT 3 VIEWS  LOCATION: [facility]  DATE/TIME: 6/2/2023 5:04 PM CDT    INDICATION: Right knee pain after a fall.  COMPARISON: None.      Impression    IMPRESSION: Normal joint spaces and alignment. No fracture or joint effusion. Mild prepatellar soft tissue swelling. Tiny 1 mm superficial calcification versus skin foreign body in the anterolateral knee.   Pelvis XR w/ unilateral hip right    Narrative    EXAM: XR PELVIS AND HIP RIGHT 1 VIEW  LOCATION: [facility]  DATE/TIME: 6/2/2023 5:03 PM CDT    INDICATION: Right hip pain after a fall.  COMPARISON: None.      Impression    IMPRESSION: Negative right hip and pelvis. No fracture or concerning bone lesion. Normal joint alignment and spacing. Mild-moderate degenerative disc and facet changes at L4-L5 and L5-S1. Unremarkable SI joints.     Recent Results (from the past 744 hour(s))   XR Wrist Left G/E 3 Views    Narrative    XR WRIST LEFT G/E 3 VIEWS 7/27/2023 2:07 PM     HISTORY:  Pain after injury    COMPARISON: None.         Impression    IMPRESSION: The left wrist is negative for fracture. Normal carpal  alignment.    LASHA FARR MD         SYSTEM ID:  [id number]       Assessment:  1. Extensor carpi ulnaris tendinitis    2. Left wrist pain        Plan:  Discussed the assessment with the patient.  Follow up: will contact with MRI results   XR images independently visualized and reviewed with patient again today in clinic  Using less breakthrough pain medication over time, refill provided today severe ongoing left wrist pain, no abuse concerns  Ongoing signs of acute tenosynovitis of the ECU tendon on exam and bedside US  Given ongoing severe pain despite conservative measures, offered OT, consider repeat MRI, injection vs oral steroid options  OT and injections declined for now  New wrist brace provided, no longer needs thumb spica  Prior MRI reviewed  Oral prednisone rx deferred, did not help much last time  Oral Tylenol and topical Voltaren gel reviewed as safe OTC options, reviewed safe dosing strategies  Consider repeat CSI but lower expectations given result last time with Dr Kahn, reviewed risks and reservations, deferred for today as it has not yet been 3 months  Reviewed option to consult with ortho, referral placed today  RICE reviewed  We discussed modified progressive pain-free activity as tolerated  Supportive care reviewed  All questions were answered today  Contact us with additional questions or concerns  Signs and sx of concern reviewed      Oswaldo Maria DO, CHE  Sports Medicine Physician  [facility]      Time spent in chart review, one-on-one evaluation, discussion with patient regarding: nature of problem, clinical course, prior treatments, therapeutic options, shared-decision making, potential procedures and referrals, and charting related to the visit: 32 minutes.  If applicable, time does not include time spent performing any  procedure.            Disclaimer: This note consists of symbols derived from keyboarding, dictation and/or voice recognition software. As a result, there may be errors in the script that have gone undetected. Please consider this when interpreting information found in this chart.      Again, thank you for allowing me to participate in the care of your patient.        Sincerely,        Oswaldo Maria, DO

## 2024-10-10 NOTE — PROGRESS NOTES
Orthopaedic Surgery Hand and Upper Extremity New Clinic Note:  Date: Oct 11, 2024     Visit Provider: Clay Davila MD  Patient ID:[id number]  Referring Provider: Oswaldo Maria    Chief Complaint: Left wrist pain    HPI:  Ms. Aundrea Swenson is a 54 year old female with a history of chronic left wrist pain.  She states that she has had pain in the left wrist for over 3 years but it was particularly exaggerated in 2023 when she sustained a fall.  She has been following with a nonoperative sports medicine doctors since that time and was received several injections to the ECU tendon of her left wrist.  In the past she has obtained an MRI which demonstrated no significant or acute articular pathology but some inflammation within the ECU tendon.  She is receiving injections every 3 months for this pain and found it to be not helpful.  She has a sharp pain over the ulnar side of the wrist as well as hyperesthesia  to the skin of the wrist.  She states that she has numbness also to the outside of the fingers but can feel pain to the inside of the fingers.  Of note she says that she has smoked since she was 17 years old and is anywhere from 1/2-3/4 of a pack a day.    PMH  Diabetes no  Thyroid Problems no  Smoking Y/N 17-year history      PAST MEDICAL HISTORY:  Past Medical History:   Diagnosis Date    CAD (coronary artery disease) ~    Found on angioplasty at [facility] during w/u for angina       PAST SURGICAL HISTORY:  Past Surgical History:   Procedure Laterality Date    ANTERIOR CRUCIATE LIGAMENT REPAIR       SECTION      x5       MEDICATIONS:  Current Outpatient Medications   Medication Sig Dispense Refill    acetaminophen (TYLENOL) 500 MG tablet Take 1,000 mg by mouth      aspirin 81 MG tablet Take 1 tablet by mouth daily.      cyclobenzaprine (FLEXERIL) 10 MG tablet Take 1 tablet (10 mg) by mouth 3 times daily as needed (Patient not taking: Reported on 2019) 20 tablet 1    diclofenac  (VOLTAREN) 1 % topical gel Apply 2 g topically 4 times daily. 350 g 1    diclofenac (VOLTAREN) 50 MG EC tablet Take 1 tablet (50 mg) by mouth 3 times daily 90 tablet 1    EPINEPHrine (EPIPEN) 0.3 MG/0.3ML injection Inject 0.3 mls into the muscle once as needed. 2 each 1    HYDROcodone-acetaminophen (NORCO) 5-325 MG tablet Take 0.5-1 tablets by mouth every 6 hours as needed for severe pain (use as little as possible, do not drive while taking this medication). 10 tablet 0    lidocaine (LIDODERM) 5 % patch Apply 1 patches to effected area and leave in place for 12hours . Than remove and leave off for 12 hours. 30 patch 3    naproxen (NAPROSYN) 500 MG tablet Take 1 tablet (500 mg) by mouth every 12 hours 100 tablet 0    nicotine (NICODERM CQ) 7 MG/24HR patch 2h hr Place 1 patch onto the skin every 24 hours 30 patch 1    nitroglycerin (NITROSTAT) 0.4 MG SL tablet Place 1 tablet (0.4 mg) under the tongue every 5 minutes as needed Up to 3 dose, call 911 if pain persists 25 tablet 0     No current facility-administered medications for this visit.       ALLERGIES:     Allergies   Allergen Reactions    Bee Venom Shortness Of Breath and Rash    Atorvastatin Muscle Pain (Myalgia)     Cramps in calves    Chantix [Varenicline]      Became aggressive    Codeine Swelling and Rash    Penicillins Swelling and Rash    Strawberry Extract Swelling and Rash       FAMILY HISTORY:  No pertinent family history    SOCIAL HISTORY:  Social History     Tobacco Use    Smoking status: Every Day    Smokeless tobacco: Never    Tobacco comments:     pt in process of quitting per pt.       The patient's past medical, family, and social history was reviewed and confirmed.    REVIEW OF SYMPTOMS:      General: Negative   Eyes: Negative   Ear, Nose and Throat: Negative   Respiratory: Negative   Cardiovascular: Negative   Gastrointestinal: Negative   Genito-urinary: Negative   Musculoskeletal: see HPI  Neurological: Negative   Psychological:  Negative  HEME: Negative   ENDO: Negative   SKIN: Negative    VITALS:  There were no vitals filed for this visit.    EXAM:  General: NAD, A&Ox3  HEENT: NC/AT  CV: RRR by peripheral pulse  Pulmonary: Non-labored breathing on RA    Left    Inspection:  There is no evidence of open wounds.   There is no evidence of erythema or infection  There is no appreciable swelling or synovitis.  There is not appreciable intrinsic atrophy  There is well maintained thenar musculature  There is clubbing of the distal aspect of the fingers    Palpation:  There is no palpable fluctuance  There is tenderness to palpation at the left ulna including the styloid and the fovea    Motor:  Intact to median, radial, ulnar nerves    Sensory:  Sensation intact and equal to contralateral in the median and radial nerve distributions.  There is significant numbness and decreased sensation in the ulnar nerve distribution compared to the contralateral side.  The small finger is completely numb and the ulnar half of the ring finger is also numb      Examination Maneuvers:  ECU Synergy testing positive for pain  DRUJ is stable pronation supination and neutral  Flexion compression test of the elbow is positive for worsening numbness and tingling in the ulnar border of the hand  Positive Tinel's at the elbow with radiation to the ulnar border of the hand         IMAGING:    AP oblique and lateral imaging of the left wrist obtained today does not demonstrate any acute osseous abnormalities.    I also reviewed her MRI from September 2023 which demonstrates increased signal within the ECU sheath as well as tendon.  There is a suggestion of increased signal within the dorsal and volar radial ulnar ligaments.  There is no discrete TFCC tear it appears the deep foveal attachments are intact.  There is no osseous edema.    I have personally reviewed the above images and labs.         IMPRESSION AND RECOMMENDATIONS:  Aundrea Swenson is a 54 year old female with  chronic left-sided wrist pain    I explained to the patient that her case is complicated and likely represents multiple pathologies.  I explained to her that she does appear to have some degree of chronic ECU tendinitis and inflammation as given by the location of her pain and her provoked clinical examination.  However, she also has some degree of chronic cubital tunnel syndrome/ulnar neuropathy.  I explained to her that also a chronic history of smoking can be consistent with neuropathic pain to the tips of the fingers.  We had a lengthy discussion and the risks, benefits, and alternatives of treatment.  At this time I believe it is prudent to obtain an EMG/nerve conduction velocity of the left upper extremity to evaluate the degree of neuropathy in the extremity.  This may be enlightening as to how much her pain is neuropathic.  Further, I explain that we obtain a new MR arthrogram to further evaluate the DRUJ and her ulnar-sided wrist pain.  I explained to her that we should be methodical and stepwise in our approach to treating her pain and that if the nerve test does show degree of nerve compression we would begin with a cubital tunnel release.  I also counseled her that cessation or reduction in her smoking might also since significantly improve her symptoms.  If the MR arthrogram demonstrates any obvious pathology we will likely perform an arthroscopy of the wrist with debridement or repair of damaged structures but that remains to be seen based on the MRI. She will return to see me after the above investigatory tests have been performed.      Clay Davila MD    Hand, Upper Extremity & Microvascular Surgery  Department of Orthopaedic Surgery  [facility]    Addendum 10/13: correction of dictation errors

## 2024-10-11 ENCOUNTER — OFFICE VISIT (OUTPATIENT)
Dept: ORTHOPEDICS | Facility: CLINIC | Age: 54
End: 2024-10-11
Attending: FAMILY MEDICINE
Payer: COMMERCIAL

## 2024-10-11 VITALS
SYSTOLIC BLOOD PRESSURE: 112 MMHG | DIASTOLIC BLOOD PRESSURE: 74 MMHG | WEIGHT: 187 LBS | BODY MASS INDEX: 30.05 KG/M2 | HEIGHT: 66 IN

## 2024-10-11 DIAGNOSIS — M77.8 EXTENSOR CARPI ULNARIS TENDINITIS: ICD-10-CM

## 2024-10-11 DIAGNOSIS — M25.532 LEFT WRIST PAIN: Primary | ICD-10-CM

## 2024-10-11 PROCEDURE — 99204 OFFICE O/P NEW MOD 45 MIN: CPT | Performed by: STUDENT IN AN ORGANIZED HEALTH CARE EDUCATION/TRAINING PROGRAM

## 2024-10-11 NOTE — LETTER
10/11/2024      Aundrea Swenson  [address] [facility] [id number]      Dear Colleague,    Thank you for referring your patient, Aundrea Swenson, to the [facility]. Please see a copy of my visit note below.    Orthopaedic Surgery Hand and Upper Extremity New Clinic Note:  Date: Oct 11, 2024     Visit Provider: Clay Davila MD  Patient ID:[id number]  Referring Provider: Oswaldo Maria    Chief Complaint: Left wrist pain    HPI:  Ms. Aundrea Swenson is a 54 year old female with a history of chronic left wrist pain.  She states that she has had pain in the left wrist for over 3 years but it was particularly exaggerated in 2023 when she sustained a fall.  She has been following with a nonoperative sports medicine doctors since that time and was received several injections to the ECU tendon of her left wrist.  In the past she has obtained an MRI which demonstrated no significant or acute articular pathology but some inflammation within the ECU tendon.  She is receiving injections every 3 months for this pain and found it to be not helpful.  She has a Gavigan pain over the ulnar side of the wrist as well as hyper GZ a to the skin of the wrist.  She states that she has numbness also to the outside of the fingers but can feel pain to the inside of the fingers.  Of note she says that she has smoked since she was 17 years old and is anywhere from 1/2-3/4 of a pack a day.    PMH  Diabetes no  Thyroid Problems no  Smoking Y/N 17-year history      PAST MEDICAL HISTORY:  Past Medical History:   Diagnosis Date     CAD (coronary artery disease) ~    Found on angioplasty at [facility] during w/u for angina       PAST SURGICAL HISTORY:  Past Surgical History:   Procedure Laterality Date     ANTERIOR CRUCIATE LIGAMENT REPAIR        SECTION      x5       MEDICATIONS:  Current Outpatient Medications   Medication Sig Dispense Refill     acetaminophen (TYLENOL) 500 MG tablet Take 1,000  mg by mouth       aspirin 81 MG tablet Take 1 tablet by mouth daily.       cyclobenzaprine (FLEXERIL) 10 MG tablet Take 1 tablet (10 mg) by mouth 3 times daily as needed (Patient not taking: Reported on 8/14/2019) 20 tablet 1     diclofenac (VOLTAREN) 1 % topical gel Apply 2 g topically 4 times daily. 350 g 1     diclofenac (VOLTAREN) 50 MG EC tablet Take 1 tablet (50 mg) by mouth 3 times daily 90 tablet 1     EPINEPHrine (EPIPEN) 0.3 MG/0.3ML injection Inject 0.3 mls into the muscle once as needed. 2 each 1     HYDROcodone-acetaminophen (NORCO) 5-325 MG tablet Take 0.5-1 tablets by mouth every 6 hours as needed for severe pain (use as little as possible, do not drive while taking this medication). 10 tablet 0     lidocaine (LIDODERM) 5 % patch Apply 1 patches to effected area and leave in place for 12hours . Than remove and leave off for 12 hours. 30 patch 3     naproxen (NAPROSYN) 500 MG tablet Take 1 tablet (500 mg) by mouth every 12 hours 100 tablet 0     nicotine (NICODERM CQ) 7 MG/24HR patch 2h hr Place 1 patch onto the skin every 24 hours 30 patch 1     nitroglycerin (NITROSTAT) 0.4 MG SL tablet Place 1 tablet (0.4 mg) under the tongue every 5 minutes as needed Up to 3 dose, call 911 if pain persists 25 tablet 0     No current facility-administered medications for this visit.       ALLERGIES:     Allergies   Allergen Reactions     Bee Venom Shortness Of Breath and Rash     Atorvastatin Muscle Pain (Myalgia)     Cramps in calves     Chantix [Varenicline]      Became aggressive     Codeine Swelling and Rash     Penicillins Swelling and Rash     Strawberry Extract Swelling and Rash       FAMILY HISTORY:  No pertinent family history    SOCIAL HISTORY:  Social History     Tobacco Use     Smoking status: Every Day     Smokeless tobacco: Never     Tobacco comments:     pt in process of quitting per pt.       The patient's past medical, family, and social history was reviewed and confirmed.    REVIEW OF  SYMPTOMS:      General: Negative   Eyes: Negative   Ear, Nose and Throat: Negative   Respiratory: Negative   Cardiovascular: Negative   Gastrointestinal: Negative   Genito-urinary: Negative   Musculoskeletal: see HPI  Neurological: Negative   Psychological: Negative  HEME: Negative   ENDO: Negative   SKIN: Negative    VITALS:  There were no vitals filed for this visit.    EXAM:  General: NAD, A&Ox3  HEENT: NC/AT  CV: RRR by peripheral pulse  Pulmonary: Non-labored breathing on RA    Left    Inspection:  There is no evidence of open wounds.   There is no evidence of erythema or infection  There is no appreciable swelling or synovitis.  There is not appreciable intrinsic atrophy  There is well maintained thenar musculature  There is clubbing of the distal aspect of the fingers    Palpation:  There is no palpable fluctuance  There is tenderness to palpation at the left ulna including the styloid and the fovea    Motor:  Intact to median, radial, ulnar nerves    Sensory:  Sensation intact and equal to contralateral in the median and radial nerve distributions.  There is significant numbness and decreased sensation in the ulnar nerve distribution compared to the contralateral side.  The small finger is completely numb and the ulnar half of the ring finger is also numb      Examination Maneuvers:  ECU Synergy testing positive for pain  DRUJ is stable pronation supination and neutral  Flexion compression test of the elbow is positive for worsening numbness and tingling in the ulnar border of the hand  Positive Tinel's at the elbow with radiation to the ulnar border of the hand         IMAGING:    AP oblique and lateral imaging of the left wrist obtained today does not demonstrate any acute osseous abnormalities.    I also reviewed her MRI from September 2023 which demonstrates increased signal within the ECU sheath as well as tendon.  There is a suggestion of increased signal within the dorsal and volar radial ulnar  ligaments.  There is no discrete TFCC tear it appears the deep foveal attachments are intact.  There is no osseous edema.    I have personally reviewed the above images and labs.         IMPRESSION AND RECOMMENDATIONS:  Aundrea Swenson is a 54 year old female with chronic left-sided wrist pain    I explained to the patient that her case is complicated and likely represents multiple pathologies.  I explained to her that she does appear to have some degree of chronic ECU tendinitis and inflammation as given by the location of her pain and her provoked clinical examination.  However, she also has some degree of chronic cubital tunnel syndrome/ulnar neuropathy.  I explained to her that also a chronic history of smoking can be consistent with neuropathic pain to the tips of the fingers.  We had a lengthy discussion and the risks, benefits, and alternatives of treatment were discussed.  At this time I believe it is prudent to obtain an EMG/nerve conduction velocity of the left upper extremity to evaluate the degree of neuropathy in the extremity.  This may be enlightening as to how much her pain is neuropathic.  Further, I explain that we obtain a new MR arthrogram to further evaluate the DRUJ and her ulnar-sided wrist pain.  I explained to her that we should be methodical and stepwise in our approach to treating her pain and that if the nerve test does show degree of nerve compression we would begin with a cubital tunnel release.  I also counseled her that cessation or reduction in her smoking might also since significantly improve her symptoms.  If the MR arthrogram demonstrates any obvious pathology we will likely perform an arthroscopy of the wrist with debridement or repair of damaged structures but that remains to be seen based on the MRI. She will return to see me after the above investigatory tests have been performed.      Clay Davila MD    Hand, Upper Extremity & Microvascular  Surgery  Department of Orthopaedic Surgery  [facility]            Again, thank you for allowing me to participate in the care of your patient.        Sincerely,        Clay Davila MD

## 2024-10-18 DIAGNOSIS — M25.532 LEFT WRIST PAIN: Primary | ICD-10-CM

## 2024-10-24 ENCOUNTER — HOSPITAL ENCOUNTER (OUTPATIENT)
Dept: GENERAL RADIOLOGY | Facility: CLINIC | Age: 54
Discharge: HOME | End: 2024-10-24
Attending: STUDENT IN AN ORGANIZED HEALTH CARE EDUCATION/TRAINING PROGRAM
Payer: COMMERCIAL

## 2024-10-24 ENCOUNTER — HOSPITAL ENCOUNTER (OUTPATIENT)
Dept: ULTRASOUND IMAGING | Facility: CLINIC | Age: 54
Discharge: HOME | End: 2024-10-24
Attending: STUDENT IN AN ORGANIZED HEALTH CARE EDUCATION/TRAINING PROGRAM
Payer: COMMERCIAL

## 2024-10-24 ENCOUNTER — HOSPITAL ENCOUNTER (OUTPATIENT)
Dept: MRI IMAGING | Facility: CLINIC | Age: 54
Discharge: HOME | End: 2024-10-24
Attending: STUDENT IN AN ORGANIZED HEALTH CARE EDUCATION/TRAINING PROGRAM
Payer: COMMERCIAL

## 2024-10-24 DIAGNOSIS — M25.532 LEFT WRIST PAIN: ICD-10-CM

## 2024-10-24 PROCEDURE — 77002 NEEDLE LOCALIZATION BY XRAY: CPT

## 2024-10-24 PROCEDURE — 250N000011 HC RX IP 250 OP 636: Mod: JZ | Performed by: STUDENT IN AN ORGANIZED HEALTH CARE EDUCATION/TRAINING PROGRAM

## 2024-10-24 PROCEDURE — A9585 GADOBUTROL INJECTION: HCPCS | Performed by: STUDENT IN AN ORGANIZED HEALTH CARE EDUCATION/TRAINING PROGRAM

## 2024-10-24 PROCEDURE — 250N000009 HC RX 250: Performed by: STUDENT IN AN ORGANIZED HEALTH CARE EDUCATION/TRAINING PROGRAM

## 2024-10-24 PROCEDURE — 73222 MRI JOINT UPR EXTREM W/DYE: CPT | Mod: LT

## 2024-10-24 PROCEDURE — 20606 DRAIN/INJ JOINT/BURSA W/US: CPT | Mod: LT

## 2024-10-24 PROCEDURE — 96372 THER/PROPH/DIAG INJ SC/IM: CPT | Performed by: STUDENT IN AN ORGANIZED HEALTH CARE EDUCATION/TRAINING PROGRAM

## 2024-10-24 PROCEDURE — 255N000002 HC RX 255 OP 636: Performed by: STUDENT IN AN ORGANIZED HEALTH CARE EDUCATION/TRAINING PROGRAM

## 2024-10-24 RX ORDER — GADOBUTROL 604.72 MG/ML
2 INJECTION INTRAVENOUS ONCE
Status: COMPLETED | OUTPATIENT
Start: 2024-10-24 | End: 2024-10-24

## 2024-10-24 RX ORDER — EPINEPHRINE 1 MG/ML
1 INJECTION, SOLUTION, CONCENTRATE INTRAVENOUS ONCE
Status: COMPLETED | OUTPATIENT
Start: 2024-10-24 | End: 2024-10-24

## 2024-10-24 RX ORDER — BUPIVACAINE HYDROCHLORIDE 2.5 MG/ML
10 INJECTION, SOLUTION INFILTRATION; PERINEURAL ONCE
Status: COMPLETED | OUTPATIENT
Start: 2024-10-24 | End: 2024-10-24

## 2024-10-24 RX ORDER — LIDOCAINE HYDROCHLORIDE 10 MG/ML
5 INJECTION, SOLUTION EPIDURAL; INFILTRATION; INTRACAUDAL; PERINEURAL ONCE
Status: COMPLETED | OUTPATIENT
Start: 2024-10-24 | End: 2024-10-24

## 2024-10-24 RX ADMIN — LIDOCAINE HYDROCHLORIDE 5 ML: 10 INJECTION, SOLUTION EPIDURAL; INFILTRATION; INTRACAUDAL; PERINEURAL at 13:15

## 2024-10-24 RX ADMIN — BUPIVACAINE HYDROCHLORIDE 25 MG: 2.5 INJECTION, SOLUTION EPIDURAL; INFILTRATION; INTRACAUDAL; PERINEURAL at 13:15

## 2024-10-24 RX ADMIN — EPINEPHRINE 1 MG: 1 INJECTION, SOLUTION, CONCENTRATE INTRAVENOUS at 13:14

## 2024-10-24 RX ADMIN — GADOBUTROL 2 ML: 604.72 INJECTION INTRAVENOUS at 13:18

## 2024-11-04 NOTE — PROGRESS NOTES
Orthopaedic Surgery Hand and Upper Extremity Follow Up Clinic Note:  Date: Nov 8, 2024     Visit Provider: Clay Davila MD  Patient ID:[id number]    Chief Complaint: Left wrist pain    Chief Complaint    Left Wrist - Pain, Follow Up       Dominant Hand: right    Occupation: PCA     Date of Last Visit: 10/11/2024    Subjective:  Aundrea Swenson is a 54 year old female who returns 4 weeks after last visit for the above.  She is here to review her MR arthrogram.  She has not obtained her EMG because the scheduling is all the way into December with [place].  She states that the pain is the same as last time and the numbness seems to be getting worse.    Objective:    There were no vitals taken for this visit.      General: alert, appropriate, NAD  HEENT: NC/AT  CV: RRR by pulse  Pulm: Unlabored on RA  MSK:  Left wrist  Tender to palpation over SL interval  Nontender palpation of the distal pole scaphoid  Negative Turner scaphoid shift  Pain during pronation past neutral  Fully extending and flexing all digits  Wrist range of motion severely limited due to pain 20-20 the direction  Diminished sensation of the dorsum of the hand in the small and ring digits    Imaging:    MRI obtained at [facility] reviewed by me today.  It demonstrates degenerative changes within the SL and LT ligaments.  There is a large cyst in the lunate.  There is a tear within the radial ulnar ligaments.    Assessment/Plan:  Left wrist pain and numbness    Aundrea Swenson is a 54-year-old female who presents today to me for evaluation and follow-up after MRI.  I discussed the radiographic findings with her as they relate to her clinical exam and complaints.  We discussed that the fact that the wrist MRI does not demonstrate any obvious pathology that correlates with her complaints is reassuring that there is no major internal derangement of the wrist that we should intervene on.  We discussed that the fact that the numbness and tingling  is getting worse is consistent with a potential compressive neuropathy of the ulnar nerve.  I discussed with her that I think that we should consider faxing her EMG order to another service within the [facility] so that she could obtain her EMG little bit faster.  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  I discussed with her the risks, benefits, alternatives of operative and nonoperative management of this condition.  We discussed that operative management would include a cubital tunnel release to potentially improve her symptoms.  I told her that we could do this before the end of the year ideally we would watch her symptoms for 3 to 6 months after that.  I was gerri in my discussion with her that I am not sure that cubital tunnel release will necessarily improve all of her symptoms but that if we can return her to a baseline manageable level of pain where she was nearly a year ago that that would be considered a clinical success.  She reviewed this plan and was offered the opportunity to ask questions which were subsequently answered to her satisfaction.  She will continue to wear the wrist brace in the meantime and we will send her for EMG ASAP.  I will call her after the results are obtained.      Clay Davila MD    Hand, Upper Extremity & Microvascular Surgery  Department of Orthopaedic Surgery  [facility]

## 2024-11-08 ENCOUNTER — OFFICE VISIT (OUTPATIENT)
Dept: ORTHOPEDICS | Facility: CLINIC | Age: 54
End: 2024-11-08
Payer: COMMERCIAL

## 2024-11-08 DIAGNOSIS — S69.92XD INJURY OF LEFT WRIST, SUBSEQUENT ENCOUNTER: ICD-10-CM

## 2024-11-08 DIAGNOSIS — M25.532 LEFT WRIST PAIN: Primary | ICD-10-CM

## 2024-11-08 PROCEDURE — 99214 OFFICE O/P EST MOD 30 MIN: CPT | Performed by: STUDENT IN AN ORGANIZED HEALTH CARE EDUCATION/TRAINING PROGRAM

## 2024-11-08 NOTE — LETTER
11/8/2024      Aundrea Swenson  [address] [id number]      Dear Colleague,    Thank you for referring your patient, Aundrea Swenson, to the [facility]. Please see a copy of my visit note below.    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  She is here to review her MR arthrogram.  She has not obtained her EMG because the scheduling is all the way into December with [place].  She states that the pain is the same as last time and the numbness seems to be getting worse.    Objective:    There were no vitals taken for this visit.      General: alert, appropriate, NAD  HEENT: NC/AT  CV: RRR by pulse  Pulm: Unlabored on RA  MSK:  Left wrist  Tender to palpation over SL interval  Nontender palpation of the distal pole scaphoid  Negative Turner scaphoid shift  Pain during pronation past neutral  Fully extending and flexing all digits  Wrist range of motion severely limited due to pain 20-20 the direction  Diminished sensation of the dorsum of the hand in the small and ring digits    Imaging:    MRI obtained at [facility] reviewed by me today.  It demonstrates degenerative changes within the SL and LT ligaments.  There is a large cyst in the lunate.  There is a tear within the radial ulnar ligaments.    Assessment/Plan:  Left wrist pain and numbness    Aundrea Swenson is a 54-year-old female who presents today to me for evaluation and follow-up after MRI.  I discussed the radiographic findings with her as they relate to her clinical exam and complaints.  We discussed that the fact that the wrist  MRI does not demonstrate any obvious pathology that correlates with her complaints is reassuring that there is no major internal derangement of the wrist that we should intervene on.  We discussed that the fact that the numbness and tingling is getting worse is consistent with a potential compressive neuropathy of the ulnar nerve.  I discussed with her that I think that we should consider faxing her EMG order to another service within the [facility] so that she could obtain her EMG little bit faster.  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  I discussed with her the risks, benefits, alternatives of operative and nonoperative management of this condition.  We discussed that operative management would include a cubital tunnel release to potentially improve her symptoms.  I told her that we could do this before the end of the year ideally we would watch her symptoms for 3 to 6 months after that.  I was gerri in my discussion with her that I am not sure that cubital tunnel release will necessarily improve all of her symptoms but that if we can return her to a baseline manageable level of pain where she was nearly a year ago that that would be considered a clinical success.  She reviewed this plan and was offered the opportunity to ask questions which were subsequently answered to her satisfaction.  She will continue to wear the wrist brace in the meantime and we will send her for EMG ASAP.  I will call her after the results are obtained.      Clay Davial MD    Hand, Upper Extremity & Microvascular Surgery  Department of Orthopaedic Surgery  [facility]      Again, thank you for allowing me to participate in the care of your patient.        Sincerely,        Clay Davila MD

## 2024-11-09 ENCOUNTER — HEALTH MAINTENANCE LETTER (OUTPATIENT)
Age: 54
End: 2024-11-09

## 2024-11-15 ENCOUNTER — TELEPHONE (OUTPATIENT)
Dept: ORTHOPEDICS | Facility: CLINIC | Age: 54
End: 2024-11-15
Payer: COMMERCIAL

## 2024-11-15 NOTE — TELEPHONE ENCOUNTER
Other: Pt is requesting a call back to see where the doctor sent her EMG order to out side of [facility].     Could we send this information to you in [employer] or would you prefer to receive a phone call?:   Patient would prefer a phone call   Okay to leave a detailed message?: Yes at Cell number on file:    Telephone Information:   Mobile [phone]

## 2024-11-15 NOTE — TELEPHONE ENCOUNTER
Discussed with Josh Lind ATC. He states an EMG order was faxed to [facility] on 11/8/24.     Phone call to [facility]. They state they have received the order and patient is on the call list to be contacted today.     Phone call to patient and she was informed of the above. Provided [facility] phone number for patient to call to schedule also. She asked how we will get results since they won't be on Mychart. She was informed that the order should have our fax number on it. She was provided with our fax number to have them fax to us as well. She verbalized understanding and was appreciative of the call.     KARISSA Ndiaye RN

## 2024-11-21 ENCOUNTER — TRANSFERRED RECORDS (OUTPATIENT)
Dept: HEALTH INFORMATION MANAGEMENT | Facility: CLINIC | Age: 54
End: 2024-11-21
Payer: COMMERCIAL

## 2024-11-22 ENCOUNTER — TELEPHONE (OUTPATIENT)
Dept: ORTHOPEDICS | Facility: CLINIC | Age: 54
End: 2024-11-22

## 2024-11-22 NOTE — TELEPHONE ENCOUNTER
Received EMG results from [facility].    Perr last ov note of 11/8/24, will call with results.    Placed in provider basket for review.    Jocelyn Arce

## 2024-11-25 ENCOUNTER — TELEPHONE (OUTPATIENT)
Dept: ORTHOPEDICS | Facility: CLINIC | Age: 54
End: 2024-11-25
Payer: COMMERCIAL

## 2024-11-25 NOTE — TELEPHONE ENCOUNTER
Pt would like a CALL BACK to discuss her EMG results.    Pt wants to schedule her surgery, as soon as possible, but is waiting on the EMG results, so this request is being marked as HIGH PRIORITY.     Please CALL pt. Thank you.

## 2024-12-03 ENCOUNTER — TELEPHONE (OUTPATIENT)
Dept: ORTHOPEDICS | Facility: CLINIC | Age: 54
End: 2024-12-03
Payer: COMMERCIAL

## 2024-12-03 NOTE — TELEPHONE ENCOUNTER
Teaching Flowsheet   Relevant Diagnosis: Acute pain of left wrist [M25.532]  - Primary   Teaching Topic: 12/23 RELEASE, CUBITAL TUNNEL, left  with Dr. Davila at [facility].   NINFA 12/4 at an outside facility needs to be faxed to [phone]     Person(s) involved in teaching:   Patient     Motivation Level:  Asks Questions: Yes  Eager to Learn: Yes  Cooperative: Yes  Receptive (willing/able to accept information): Yes  Any cultural factors/[place] beliefs that may influence understanding or compliance? No     Patient demonstrates understanding of the following:  Reason for the appointment, diagnosis and treatment plan: Yes  Knowledge of proper use of medications and conditions for which they are ordered (with special attention to potential side effects or drug interactions): Yes  Which situations necessitate calling provider and whom to contact: Yes     Proper use and care of  (medical equip, care aids, etc.): Yes  Nutritional needs and diet plan: Yes  Pain management techniques: Yes  Wound Care: Yes  How and/when to access community resources: Yes     Instructional Materials Used/Given: Reviewed same-day surgery instructions with patient (NPO, showering, stoplight tool, need for pre-op H&P within 30 days of procedure, and responsible adult /person to stay with patient for 24 hours post surgery)

## 2024-12-23 ENCOUNTER — DOCUMENTATION ONLY (OUTPATIENT)
Dept: OTHER | Facility: CLINIC | Age: 54
End: 2024-12-23
Payer: COMMERCIAL

## 2024-12-23 NOTE — PROGRESS NOTES
Postoperative Instructions - SOFT TISSUE PROTOCOL    Day 1 after surgery:      Keep bandages clean and dry.  When you shower, wrap the hand and elbow in a plastic bag.  Use tape to seal off water from leaking in.  Shower with your arm above shoulder level.      Day 2 after surgery:      Take off the bandages, but not the steri-strips (white strips placed on the skin during surgery).   If you have a yellow piece of gauze on the wound, take it off.  Wash your hand, wrist and forearm with mild soap.  IT IS OK TO WASH THE INCISION NOW.  Pat the incision dry with a clean towel.  Do not use moisturizing lotions or Bacitracin.  Leave the steri-strips on (if there are any) until they fall off by themselves in the shower. DO NOT REMOVE the stitches.  If incision itches, take some Benadryl, but don t scratch the incision.  Avoid placing a Band-aid on the incision.  If there is any drainage, place a clean gauze and attach with tape.   Let the incision  breath  as much as possible.  Don t be concerned if the incision is slightly open.  The skin actually seals in 24 hours after the surgical incision.    Days 3-12:      Do not submerge your hand into a bath or a sink.  Use running water only until the wound heals completely.  Don t cover the incision with Band-aids.  Let it  breathe .  If there is discharge, cover with gauze dressing.  Your stitches or staples should be removed around day 14.    Care of swelling:      Keep your operative hand/elbow elevated: hand above elbow, elbow above the heart.  Sleep on your back with your hand and elbow on top of your body on a pillow.  If swollen, apply ice: 20 min on and 20 min off with a thin towel between ice and skin.  Move your fingers as much as you can to prevent scarring of nerves and tendons and to decrease swelling.    Care of pain:      Do not play a  catch up  game with pain.  Take Tylenol and Ibuprofen (Motrin/Advil) or an equivalent around the clock for the first 1-3 days.   It is much easier to anticipate pain and treat it, rather than treat it after it has appeared. Take tylenol 650mg every 6 hours (never more than 3000mg in one day). If you do not have kidney or stomach disease, take Ibuprofen (Motrin/Advil) 400mg every 6 hours. Alternate and stagger these medications so you are taking something every 3 hours. For example, take tylenol then three hours later take ibuprofen. Three hours after that, take tylenol again and repeat around the clock except while sleeping.     Things to watch out for:      Complications from these procedures are rare, but they do happen.  If you have any unusual pain, fever, redness, drainage from the wound, bleeding, bluishness of the fingers, other than normal mild bruising of skin, do not wait until your first post-operative visit - make a phone call to our office and speak to a professional as soon as you suspect that something is wrong. WE ARE ALWAYS AVAILABLE TO ANSWER YOUR QUESTIONS.  It is common to experience bruising or discoloration in other parts of the hand, wrist, forearm, and elbow after surgery anywhere in the extremity.     First postoperative visit:      Your first postoperative visit should be within 14 days after surgery.  Call the office, where you were initially seen, to make an appointment.  Your stitches will be removed, and it will be determined if you need any therapy.      First year:      Expect your condition to improve for about 1 year after the surgery.  There is usually significant improvement in the first 2 months, and then a slow, steady improvement for the rest of the year.  Protect the scar from sunlight with SPF 50 for 1 year, so that it does not develop a permanent  sunburn  appearance. After the skin has healed, you may begin to put Vitamin E ointment on the scar to help minimize the appearance.

## 2024-12-23 NOTE — PROGRESS NOTES
Operative Report  Clay Davila MD     Date of Procedure: 2024     Location:      Name:Aundrea Swenson  : 1970  MRN: [id number]      Preoperative Diagnosis: Left Sided Cubital Tunnel Syndrome    Postoperative Diagnosis: Same       Procedure(s) Performed: Neuroplasty, ulnar nerve at the elbow, without transposition (01032)    Implants: None    Surgeon(s): Clay Davila MD-Primary; MORE BullockC     Procedure Summary  Anesthesia: General  Estimated Blood Loss: 5 cc  Total IV Fluids: see anesthesia record mL  Drains:          Procedural Indications: Aundrea Swenson is a 54 year old female with a history of the numbness and tingling in the ulnar nerve distribution on the left hand. They have failed conservative management and are seeking a permanent solution to this problem.  The surgical option of open cubital tunnel release, possible transposition as well as the relative risks and benefits were explained.  I have recommended release.  Risks of surgery include not limited to infection, bleeding, damage to surrounding neurovascular structures, stiffness, need for further surgery were reviewed. The benefits include improvement in the numbness and tingling and nocturnal symptoms. The alternatives of nonoperative management were also discussed. The patient wished to proceed.         Procedure Details: The patient was seen in the preoperative area. The risks, benefits, complications, treatment options, non-operative alternatives, expected recovery and outcomes were discussed with the patient. The possibilities of reaction to medication, pulmonary aspiration, injury to surrounding structures, bleeding, recurrent infection, the need for additional procedures, failure to diagnose a condition, and creating a complication requiring transfusion or operation were discussed with the patient. The patient concurred with the proposed plan, giving informed consent.  The site of surgery was properly noted/marked if  necessary per policy. The patient has been actively warmed in preoperative area. Venous thrombosis prophylaxis have been ordered including bilateral sequential compression devices    Following adequate anesthesia, the patient was placed in the supine position.  The left upper extremity was prepped and draped in sterile fashion.  The left upper extremity was lightly exsanguinated using an Esmarch and a proximal arm tourniquet elevated to 230 mmHg.    An longitudinal curvilinear incision was drawn with a skin marker just posterior to the medial epicondyle.  The area over the planned incision was infiltrated with 10 cc of a 50/50 mix of 0.25% Marcaine mixed with 1% lidocaine with epinephrine.  The skin was incised with a scalpel and subcutaneous tissues were opened.  Traversing longitudinal structures were protected including veins and superficial sensory branches of the medial antebrachial cutaneous nerve.  Blunt dissection was performed down to the level of the cubital tunnel retinaculum.  The cubital tunnel was opened just posterior to the medial epicondyle.  The nerve was identified within the cubital tunnel and protected throughout the case.  The release was performed proximally with tenotomy scissors.  Traversing structures were protected and freed from the cubital tunnel retinaculum which was released proximally.  The proximal aspect of the ulnar nerve was inspected and there was no evidence of focal compression.      Next, we turned our attention distally and the nerve was released along its course up to the level of the FCU fascia. There was an accessory anconeus epitrochlearis muscle which was released along its length with bipolar electrocautery.  The superficial fascia of the FCU was opened with tenotomy scissors and released.  The oblique fibers of the FCU were bluntly spread revealing the deep fascia of the FCU.  The ulnar nerve was protected with a [place] elevator passed deep to the deep fascia of the FCU  and the deep fascia of the FCU was released with tenotomy scissors.     The elbow was taken through complete range of motion and the ulnar nerve was noted to be stable within the groove.    The tourniquet was deflated and meticulous hemostasis was achieved.    The subcutaneous tissue was closed with 3-0 Monocryl suture and the skin was closed with 4-0 Monocryl suture in a running fashion.    Complications:  None; patient tolerated the procedure well.     Disposition: PACU - hemodynamically stable.  Condition: stable     A physicians assistant was required in this case to assist with retraction, protection of vital structures, cauterization of crossing vessels, and/or suctioning of fluids from the surgical field. A fully qualified resident/fellow at this teaching institution was not available to assist with this case.

## 2025-01-02 ENCOUNTER — TELEPHONE (OUTPATIENT)
Dept: ORTHOPEDICS | Facility: CLINIC | Age: 55
End: 2025-01-02

## 2025-01-02 NOTE — TELEPHONE ENCOUNTER
Patient no-showed for appointment with Dr. Davila on 1/2/2025.  Patient can reschedule with Dr. Davila at earliest convenience.  Left message with patient to call our scheduling line at [phone].    Alan Watt LAT

## 2025-01-23 ENCOUNTER — OFFICE VISIT (OUTPATIENT)
Dept: ORTHOPEDICS | Facility: CLINIC | Age: 55
End: 2025-01-23
Payer: COMMERCIAL

## 2025-01-23 DIAGNOSIS — M25.532 LEFT WRIST PAIN: Primary | ICD-10-CM

## 2025-01-23 DIAGNOSIS — M77.8 EXTENSOR CARPI ULNARIS TENDINITIS: ICD-10-CM

## 2025-01-23 RX ORDER — TRIAMCINOLONE ACETONIDE 40 MG/ML
40 INJECTION, SUSPENSION INTRA-ARTICULAR; INTRAMUSCULAR
Status: COMPLETED | OUTPATIENT
Start: 2025-01-23 | End: 2025-01-23

## 2025-01-23 RX ORDER — LIDOCAINE HYDROCHLORIDE 10 MG/ML
2 INJECTION, SOLUTION INFILTRATION; PERINEURAL
Status: COMPLETED | OUTPATIENT
Start: 2025-01-23 | End: 2025-01-23

## 2025-01-23 RX ADMIN — LIDOCAINE HYDROCHLORIDE 2 ML: 10 INJECTION, SOLUTION INFILTRATION; PERINEURAL at 10:30

## 2025-01-23 RX ADMIN — TRIAMCINOLONE ACETONIDE 40 MG: 40 INJECTION, SUSPENSION INTRA-ARTICULAR; INTRAMUSCULAR at 10:30

## 2025-01-23 NOTE — PROGRESS NOTES
Aundrea Swenson  :  1970  DOS: 2023  MRN: [id number]    Sports Medicine Clinic Procedure    Ultrasound Guided Left ECU Tendon Sheath Injection    Clinical History: Interim History - 2025  Since last visit on 10/10/2024 patient has moderate-severe left ulnar sided wrist pain.  Patient is status post left cubital release completed by Dr Davila on 24 provided relief of her radiating numbness/tingling symptoms.  No interim injury.       Diagnosis:   1. Left wrist pain    2. Extensor carpi ulnaris tendinitis      Referring Provider: Clay Davila MD  Hand / Upper Extremity Injection/Arthrocentesis: L extensor compartment 6    Date/Time: 2025 10:30 AM    Performed by: Oswaldo Maria DO  Authorized by: Oswaldo Maria DO    Indications:  Therapeutic, pain and diagnostic  Needle Size:  25 G  Guidance: ultrasound    Approach:  Ulnar  Condition: 6th dorsal compartment     Location comment:  ECU Tendon Sheath Injection    Site:  L extensor compartment 6  Medications:  40 mg triamcinolone 40 MG/ML; 2 mL lidocaine 1 %  Outcome:  Tolerated well, no immediate complications  Procedure discussed: discussed risks, benefits, and alternatives    Consent Given by:  Patient  Timeout: timeout called immediately prior to procedure    Prep: patient was prepped and draped in usual sterile fashion     Ultrasound images of procedure were permanently stored.         Impression:  Successful US guided CSI to left 6th dorsal compartment    Plan:  Follow up as directed by Dr Davila  Reviewed relative risks, goals and limitations of CSI  Reviewed Dr Davila's recs for brace usage and activity modification over the next 2 weeks, patient expressed understanding  PT options and bracing strategies reviewed  Expectations and goals of CSI reviewed  Often 2-3 days for steroid effect, and can take up to two weeks for maximum effect  We discussed modified progressive pain-free activity as tolerated  Do not  overuse in first two weeks if feeling better due to concern for vulnerability while steroid is working  Supportive care reviewed  All questions were answered today  Contact us with additional questions or concerns  Signs and sx of concern reviewed      Oswaldo Maria DO, CHE  Primary Care Sports Medicine  [facility]

## 2025-01-23 NOTE — LETTER
2025      Aundrea Swenson  [address] [facility] [address]      Dear Colleague,    Thank you for referring your patient, Aundrea Swenson, to the [facility] [place]. Please see a copy of my visit note below.    Aundrea Swenson  :  1970  DOS: 2023  MRN: [id number]    Sports Medicine Clinic Procedure    Ultrasound Guided Left ECU Tendon Sheath Injection    Clinical History: Interim History - 2025  Since last visit on 10/10/2024 patient has moderate-severe left ulnar sided wrist pain.  Patient is status post left cubital release completed by Dr Davila on 24 provided relief of her radiating numbness/tingling symptoms.  No interim injury.       Diagnosis:   1. Left wrist pain    2. Extensor carpi ulnaris tendinitis      Referring Provider: Clay Davila MD  Hand / Upper Extremity Injection/Arthrocentesis: L extensor compartment 6    Date/Time: 2025 10:30 AM    Performed by: Oswaldo Maria DO  Authorized by: Oswaldo Maria DO    Indications:  Therapeutic, pain and diagnostic  Needle Size:  25 G  Guidance: ultrasound    Approach:  Ulnar  Condition: 6th dorsal compartment     Location comment:  ECU Tendon Sheath Injection    Site:  L extensor compartment 6  Medications:  40 mg triamcinolone 40 MG/ML; 2 mL lidocaine 1 %  Outcome:  Tolerated well, no immediate complications  Procedure discussed: discussed risks, benefits, and alternatives    Consent Given by:  Patient  Timeout: timeout called immediately prior to procedure    Prep: patient was prepped and draped in usual sterile fashion     Ultrasound images of procedure were permanently stored.         Impression:  Successful US guided CSI to left 6th dorsal compartment    Plan:  Follow up as directed by Dr Davila  Reviewed relative risks, goals and limitations of CSI  Reviewed Dr Davila's recs for brace usage and activity modification over the next 2 weeks, patient expressed  understanding  PT options and bracing strategies reviewed  Expectations and goals of CSI reviewed  Often 2-3 days for steroid effect, and can take up to two weeks for maximum effect  We discussed modified progressive pain-free activity as tolerated  Do not overuse in first two weeks if feeling better due to concern for vulnerability while steroid is working  Supportive care reviewed  All questions were answered today  Contact us with additional questions or concerns  Signs and sx of concern reviewed      Oswaldo Maria DO, CAQ  Primary Care Sports Medicine  [facility]           Again, thank you for allowing me to participate in the care of your patient.        Sincerely,        Oswaldo Maria DO    Electronically signed

## 2025-07-08 NOTE — PROGRESS NOTES
Orthopaedic Surgery Hand and Upper Extremity Follow Up Clinic Note:  Date: Jul 16, 2025     Visit Provider: Clay Davila MD  Patient ID:[id number]    Procedure Performed: Neuroplasty, ulnar nerve at the elbow, without transposition (20849)      Date of Surgery: 12/23/2024    Subjective:  Aundrea Swenson is a 54 year old female who returns 7 months after the above procedure. She states her last injection to the Left ECU tendon with Dr. Maria lasted for 2 months or so.  The pain in her tendon has come back. She experiences throbbing and aching pain, which worsens with increased activity and use of the wrist, and is aggravated by her caregiving responsibilities for her parents and increased household activity due to her daughter and grandchildren staying with her. She is wearing a wrist brace currently. She has attempted to use ice over the brace for 10 minutes at a time, but found that the pain returns as soon as the ice is removed and the area warms up, making icing intolerable.  She also takes Tylenol and Ibuprofen to manage pain, she states this only takes the edge off.  She manages swelling with ice and elevation.  Her numbness and tingling has almost completely gone away, she only has mild numbness to the tip of her little finger.  She feels the surgery was a success.    Objective:    There were no vitals taken for this visit.    MSK:  Incision over the left elbow clean dry and intact  No erythema or ecchymosis  Mild tenderness to palpation over the 2 heads of the FCU and the medial intermuscular septum  Able to make full composite fist, able to make achieve full extension  Tenderness to palpation over the ECU  Pain on resisted wrist extension, ECU Synergy test positive for pain  Sensation intact and normal to the median, radial, ulnar nerve distributions  Capillary beds warm and well-perfused    Imaging:    No new imaging    Assessment/Plan:  L ECU tendonitis    Aundrea Swenson is a 54 year old female here for the  above. She feels like the burning pain/numbness/tingling to the L hand is significantly improved, but she has persistent pain over the ECU tendon. She has tenderness to palpation and a positive ECU synergy test. 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 She has failed nonoperative management including multiple corticosteroid injections. I discussed with her the risks, benefits, alternatives of operative management including an ECU tendon debridement and possible subsheath reconstruction. I discussed with her the risks of bleeding, infection, damage to surrounding tendon or neurovascular structures, failure to improve condition, worsening of condition, need for further surgery. Despite the above risks a shared decision was made to pursue operative management given the significant dysfunction she is experiencing. She will be scheduled for earliest convenience.        Clay Davila MD    Hand, Upper Extremity & Microvascular Surgery  Department of Orthopaedic Surgery  [facility]

## 2025-07-16 ENCOUNTER — OFFICE VISIT (OUTPATIENT)
Dept: ORTHOPEDICS | Facility: CLINIC | Age: 55
End: 2025-07-16
Payer: COMMERCIAL

## 2025-07-16 DIAGNOSIS — M77.8 EXTENSOR CARPI ULNARIS TENDINITIS: Primary | ICD-10-CM

## 2025-07-16 PROCEDURE — 99214 OFFICE O/P EST MOD 30 MIN: CPT | Performed by: STUDENT IN AN ORGANIZED HEALTH CARE EDUCATION/TRAINING PROGRAM

## 2025-07-16 NOTE — LETTER
7/16/2025      Aundrea Swenson  [address] [facility] [address]      Dear Colleague,    Thank you for referring your patient, Aundrea Swenson, to the [facility] [place]. Please see a copy of my visit note below.    Orthopaedic Surgery Hand and Upper Extremity Follow Up Clinic Note:  Date: Jul 16, 2025     Visit Provider: Clay Davila MD  Patient ID:[id number]    Procedure Performed: Neuroplasty, ulnar nerve at the elbow, without transposition (30839)      Date of Surgery: 12/23/2024    Subjective:  Aundrea Swenson is a 54 year old female who returns 7 months after the above procedure. She states her last injection to the Left ECU tendon with Dr. Maria lasted for 2 months or so.  The pain in her tendon has come back. She experiences throbbing and aching pain, which worsens with increased activity and use of the wrist, and is aggravated by her caregiving responsibilities for her parents and increased household activity due to her daughter and grandchildren staying with her. She is wearing a wrist brace currently. She has attempted to use ice over the brace for 10 minutes at a time, but found that the pain returns as soon as the ice is removed and the area warms up, making icing intolerable.  She also takes Tylenol and Ibuprofen to manage pain, she states this only takes the edge off.  She manages swelling with ice and elevation.  Her numbness and tingling has almost completely gone away, she only has mild numbness to the tip of her little finger.  She feels the surgery was a success.    Objective:    There were no vitals taken for this visit.    MSK:  Incision over the left elbow clean dry and intact  No erythema or ecchymosis  Mild tenderness to palpation over the 2 heads of the FCU and the medial intermuscular septum  Able to make full composite fist, able to make achieve full extension  Tenderness to palpation over the ECU  Pain on resisted wrist extension, ECU Synergy test positive for  pain  Sensation intact and normal to the median, radial, ulnar nerve distributions  Capillary beds warm and well-perfused    Imaging:    No new imaging    Assessment/Plan:  L ECU tendonitis    Aundrea Swenson is a 54 year old female here for the above. She feels like the burning pain/numbness/tingling to the L hand is significantly improved, but she has persistent pain over the ECU tendon. She has tenderness to palpation and a positive ECU synergy test. 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 She has failed nonoperative management including multiple corticosteroid injections. I discussed with her the risks, benefits, alternatives of operative management including an ECU tendon debridement and possible subsheath reconstruction. I discussed with her the risks of bleeding, infection, damage to surrounding tendon or neurovascular structures, failure to improve condition, worsening of condition, need for further surgery. Despite the above risks a shared decision was made to pursue operative management given the significant dysfunction she is experiencing. She will be scheduled for earliest convenience.        Clay Davila MD    Hand, Upper Extremity & Microvascular Surgery  Department of Orthopaedic Surgery  [facility]    Again, thank you for allowing me to participate in the care of your patient.        Sincerely,        Clay Davila MD    Electronically signed

## 2025-07-23 ENCOUNTER — TELEPHONE (OUTPATIENT)
Dept: ORTHOPEDICS | Facility: CLINIC | Age: 55
End: 2025-07-23
Payer: COMMERCIAL

## 2025-07-23 DIAGNOSIS — M77.8 EXTENSOR CARPI ULNARIS TENDINITIS: Primary | ICD-10-CM

## 2025-07-23 NOTE — TELEPHONE ENCOUNTER
Teaching Flowsheet     Visit Type: Telephone    Time Start: 909  Time End: 925  Total Time Spent: 16 min.    Surgeon: Dr. Davila  Location of Surgery (known or anticipated): at [facility].   Type of Anesthesia: Local or MAC  Worker's Compensation Procedure: No    Pertinent Medical History:  history of stroke, episodic alcohol abuse, tobacco use.  Past Medical History:   Diagnosis Date    CAD (coronary artery disease) ~2005    Found on angioplasty at [facility] during w/u for angina       Were medical conditions reviewed and appropriate for location? Yes  BMI: Obesity Grade I BMI 30-34.9    Relevant Diagnosis: Extensor carpi ulnaris tendinitis [M77.8]  - Primary   Teaching Topic: 7/28 DEBRIDEMENT ECU TENDON LEFT     NINFA 7/21    Person(s) involved in teaching:   Patient  : No.   Verified Patient's Phone Number: YES: [phone]    Caregiver//  Name: Aki  Phone Number: [phone]   Relationship: Significant Other  Consent to Communicate on file: No     Motivation Level:  Asks Questions: Yes  Eager to Learn: Yes  Cooperative: Yes  Receptive (willing/able to accept information): Yes  Any cultural factors/Hoahaoism beliefs that may influence understanding or compliance? No     Patient demonstrates understanding of the following:  Reason for the appointment, diagnosis and treatment plan: Yes  Knowledge of proper use of medications and conditions for which they are ordered (with special attention to potential side effects or drug interactions): Yes  Which situations necessitate calling provider and whom to contact: Yes     Teaching Concerns Addressed:   Proper use and care of medical equip, care aids, etc.: Yes  Nutritional needs and diet plan: Yes  Pain management techniques: Yes  Wound Care: Yes  How and/when to access community resources: Yes  Need for pre-op with in 30 days: YES, will be done with PCP. I asked them to ensure they go over their daily medications during this  visit and discuss what medications need to be stopped before surgery and when. If you are doing a pre-op with your PCP and they are not within the [facility], I ask them to fax it to our pre-op office. Patient verbalized understanding.       Does patient have difficulty getting a ride to appointments (post-ops, PT/OT): No  Patient's plan after discharge: home with family or spouse     Instructional Materials Used/Given:  two bottles of chlorhexidine soap and a surgery packet sent via Currently. Instructed patient to buy or get two 8 ounces bottles of antiseptic surgical soap called 4% CHG. Common name brand of this soap are Hibiclens and Exidine. I told them they can find this at their local pharmacy, clinic or retail store. If they have trouble finding it, I told them to ask their pharmacist to help them find a substitute.   - Important Contact Info/Phone Numbers: [facility] clinic number [phone].  - Map/location of surgery and follow-up appointments  - Showering instructions  - Stoplight Tool     -Next step: surgery scheduled for 7/28 and pre-op completed with PCP on 7/21.    Nereida Oswald RN

## 2025-07-27 NOTE — PROGRESS NOTES
Postoperative Instructions - BONE FIXATION PROTOCOL  Dr. Clay Davila    Care of incision:    Keep bandages and splint clean and dry.  When you shower, wrap the hand and elbow in a plastic bag.  Use tape to seal off water from leaking in.  Shower with your arm above shoulder level.      Care of swelling:    Keep your operative hand elevated: hand above elbow, elbow above the heart. If you had regional anesthesia (a nerve block), wear the sling until the block wears off. You can use the sling when you are walking around, but keep you non-immobilized joints (elbow and shoulder) moving every 2-3 hours.  Sleep on your back with your hand and elbow on top of your body on a pillow.  Apply ice in a ziplock ba min on and 20 min off to those parts that are least covered with bandages.  Move the fingers that are not immobilized as much as you can to prevent scarring of nerves and tendons and to decrease swelling.  Keeping swelling to a minimum will help you control pain and will allow you to move your fingers easier.      Care of pain:      Do not play a  catch up  game with pain. If you had regional anesthesia (a nerve block), begin the following protocol before the block has completely worn off.  Take Tylenol and Ibuprofen (Motrin/Advil) or an equivalent around the clock for the first 1-3 days.  It is much easier to anticipate pain and treat it, rather than treat it after it has appeared. Take Tylenol 650mg every 6 hours (never more than 3000mg in one day). If you do not have kidney or stomach disease, take Ibuprofen (Motrin/Advil) 400mg every 6 hours. Alternate and stagger these medications so you are taking something every 3 hours. For example, take tylenol then three hours later take ibuprofen. Three hours after that, take tylenol again and repeat around the clock except while sleeping.      If prescribed, take the opioid medication every 4-6 hours as needed, if needed. Do not drink alcohol, drink, or operate heavy  machinery while taking any opioid pain medication.     Things to watch out for:    Complications from these procedures are rare, but they do happen.  If you have any unusual pain, fever, redness, drainage from the wound, bleeding, bluishness of the fingers, other than normal mild bruising of skin, do not wait until your first post-operative visit - make a phone call to our office and speak to a professional as soon as you suspect that something is wrong.  WE ARE ALWAYS AVAILABLE TO ANSWER YOUR QUESTIONS. It is common to experience bruising or discoloration in other parts of the hand, wrist, forearm, and elbow after surgery.     First postoperative visit:    Your first postoperative visit should be on within 14 days following surgery, unless otherwise specified.  Please call the office, where you were initially seen, to make an appointment.  Your postoperative instructions, need for therapy, care of the operated extremity will be discussed in detail with you.      First year:    Expect your condition to improve for about 1 year after the surgery.  There is usually significant improvement in the first 2 months, and then a slow, steady improvement for the rest of the year.  Protect the scar from sunlight with SPF 50 for 1 year, so that it does not develop a permanent  sunburn  appearance. After the skin has healed, you may begin to put Vitamin E ointment on the scar to help minimize the appearance.

## 2025-07-28 ENCOUNTER — DOCUMENTATION ONLY (OUTPATIENT)
Dept: OTHER | Facility: CLINIC | Age: 55
End: 2025-07-28
Payer: COMMERCIAL

## 2025-07-28 DIAGNOSIS — S63.092A SUBLUXATION OF LEFT EXTENSOR CARPI ULNARIS TENDON, INITIAL ENCOUNTER: Primary | ICD-10-CM

## 2025-07-28 RX ORDER — OXYCODONE HYDROCHLORIDE 5 MG/1
5 TABLET ORAL EVERY 6 HOURS PRN
Qty: 5 TABLET | Refills: 0 | Status: SHIPPED | OUTPATIENT
Start: 2025-07-28

## 2025-07-28 NOTE — PROGRESS NOTES
Operative Report     Date of Procedure: 2025     Location: [facility]     Name:Aundrea Swenson  : 1970  MRN: [id number]       Preoperative Diagnosis: Left ECU Tendinopathy and Instability    Postoperative Diagnosis: Same       Procedure(s) Performed: ECU tendon debridement, ECU stabilization    Implants: None    Surgeon(s): Clay Davila MD-Primary; Papito Gonzalez PA-C     Procedure Summary  Anesthesia: MAC and local  Estimated Blood Loss: 5 cc  Total IV Fluids: see anesthesia record mL       Procedural Indications: This is a 54 year old female  who is having surgery for pain and instability of .her ECU. The patient has failed nonoperative management including extended courses of bracing, therapy, and corticosteroid injections. The surgical option of ECU tendon debridement with possible sub-sheath repair was explained and relative risks and benefits were explained. Risks of surgery include not limited to infection, bleeding, damage to surrounding neurovascular structures, stiffness, continued pain, dorsal ulnar nerve neuritis, need for further surgery were reviewed.  The patient wished to proceed.         Procedure Details: The patient was seen in the preoperative area. The risks, benefits, complications, treatment options, non-operative alternatives, expected recovery and outcomes were discussed with the patient. The possibilities of reaction to medication, pulmonary aspiration, injury to surrounding structures, bleeding, recurrent infection, the need for additional procedures, failure to diagnose a condition, and creating a complication requiring transfusion or operation were discussed with the patient. The patient concurred with the proposed plan, giving informed consent.  The site of surgery was properly noted/marked if necessary per policy. The patient has been actively warmed in preoperative area. Preoperative antibiotics were not indicated for this procedure. Venous thrombosis prophylaxis have been  ordered including bilateral sequential compression devices    Following adequate anesthesia, the patient placed in the supine position. 10cc of a 50/50 mix of 1% lidocaine with 1:100,000 epinephrine mixed with 0.5% bupivicaine was administered over the ulnar aspect of the wrist at the planned site of surgery. The left upper extremity was prepped and draped in sterile fashion.  A timeout was performed confirming the patient, procedure, and laterality against the signed surgical consent.  All in attendance were in agreement.  The limb was lightly exsanguinated using an Esmarch and a proximal arm tourniquet elevated to 230 mmHg.      A 4 cm incision was planned from the tip of the ulnar styloid extending proximally.  Skin and subcutaneous tissues were incised.  Gentle blunt dissection was performed and the dorsal cutaneous branch of the ulnar nerve was identified in the subcutaneous tissues.  This was gently mobilized and retracted out of the field.  The dissection was carried deep to the level of the extensor retinaculum and carried volarly to the volar aspect of the sixth extensor compartment. The wrist was taken through range of motion and the tendon was appeared to be subluxing volarly and ulnarly during flexion and ulnar deviation      The retinaculum was incised just volar to the sixth dorsal extensor compartment and the sub sheath was identified.  The retinaculum was elevated radially off of the ECU sub-sheath and taken to the radial aspect of the sixth extensor compartment in order to facilitate later repair.     The subsheath was incised at the most ulnar aspect of the ulnar groove.  There was copious tenosynovitis along the ECU tendon.  This was sharply debrided from both the tendon and the sub-sheath.  The floor of the sub sheath was inspected and debrided of tenosynovitis.    A 14g angiocath was placed along the tendon and the periosteal/subsheath flap was brought dorsally over the tendon and secured to the  radial aspect of the compartment.  The extensor retinaculum was divided into 3 ulnarly based strips in a transverse fashion.  The central slip was advanced and imbricated to help draw the ECU tendon more radially.    The other slips of the extensor retinaculum were repaired to their anatomic location.    The wrist was taken through range of motion and the tendon was stable within the groove and not incarcerated during movement.     The retinaculum was closed with 3-0 monocryl sutures.    The skin was closed with 4-0 monocryl suture in a running fashion and Steri-Strips    A sterile bulky gauze dressing as well as a short arm plaster splint was applied.    All sponge and sharp counts were correct at the end of the case      Complications:  None; patient tolerated the procedure well.     Disposition: PACU - hemodynamically stable.  Condition: stable     Plan: At her first postoperative visit she will be transition into a short arm cast.  She will spend a total of 4 weeks in immobilization and then begin active range of motion with hand therapy at week 4.  We can begin passive range of motion exercises at week 6.    A physicians assistant was required in this case to assist with retraction, protection of vital structures, cauterization of crossing vessels, and/or suctioning of fluids from the surgical field. A fully qualified resident/fellow at this teaching institution was not available to assist with this case.

## 2025-08-07 ENCOUNTER — OFFICE VISIT (OUTPATIENT)
Dept: ORTHOPEDICS | Facility: CLINIC | Age: 55
End: 2025-08-07
Payer: COMMERCIAL

## 2025-08-07 DIAGNOSIS — Z47.89 ORTHOPEDIC AFTERCARE: Primary | ICD-10-CM

## 2025-08-07 DIAGNOSIS — M77.8 EXTENSOR CARPI ULNARIS TENDINITIS: ICD-10-CM

## 2025-08-19 ENCOUNTER — MYC MEDICAL ADVICE (OUTPATIENT)
Dept: ORTHOPEDICS | Facility: CLINIC | Age: 55
End: 2025-08-19
Payer: COMMERCIAL

## 2025-08-20 ENCOUNTER — OFFICE VISIT (OUTPATIENT)
Dept: ORTHOPEDICS | Facility: CLINIC | Age: 55
End: 2025-08-20
Payer: COMMERCIAL

## 2025-08-20 DIAGNOSIS — M77.8 EXTENSOR CARPI ULNARIS TENDINITIS: Primary | ICD-10-CM

## 2025-08-20 PROCEDURE — 99024 POSTOP FOLLOW-UP VISIT: CPT | Performed by: STUDENT IN AN ORGANIZED HEALTH CARE EDUCATION/TRAINING PROGRAM

## 2025-08-20 PROCEDURE — 29075 APPL CST ELBW FNGR SHORT ARM: CPT | Mod: 58 | Performed by: STUDENT IN AN ORGANIZED HEALTH CARE EDUCATION/TRAINING PROGRAM

## 2025-08-27 ENCOUNTER — OFFICE VISIT (OUTPATIENT)
Dept: ORTHOPEDICS | Facility: CLINIC | Age: 55
End: 2025-08-27
Payer: COMMERCIAL

## 2025-08-27 DIAGNOSIS — M77.8 EXTENSOR CARPI ULNARIS TENDINITIS: Primary | ICD-10-CM

## 2025-08-27 PROCEDURE — 99024 POSTOP FOLLOW-UP VISIT: CPT | Performed by: STUDENT IN AN ORGANIZED HEALTH CARE EDUCATION/TRAINING PROGRAM

## (undated) RX ORDER — EPINEPHRINE 1 MG/ML
INJECTION, SOLUTION, CONCENTRATE INTRAVENOUS
Status: DISPENSED
Start: 2024-10-24

## (undated) RX ORDER — BUPIVACAINE HYDROCHLORIDE 2.5 MG/ML
INJECTION, SOLUTION EPIDURAL; INFILTRATION; INTRACAUDAL
Status: DISPENSED
Start: 2024-10-24

## (undated) RX ORDER — LIDOCAINE HYDROCHLORIDE 10 MG/ML
INJECTION, SOLUTION EPIDURAL; INFILTRATION; INTRACAUDAL; PERINEURAL
Status: DISPENSED
Start: 2024-10-24